# Patient Record
Sex: FEMALE | Race: OTHER | Employment: UNEMPLOYED | URBAN - METROPOLITAN AREA
[De-identification: names, ages, dates, MRNs, and addresses within clinical notes are randomized per-mention and may not be internally consistent; named-entity substitution may affect disease eponyms.]

---

## 2018-05-07 ENCOUNTER — HOSPITAL ENCOUNTER (OUTPATIENT)
Dept: ULTRASOUND IMAGING | Age: 33
Discharge: HOME | End: 2018-05-07
Attending: NURSE PRACTITIONER
Payer: MEDICAID

## 2018-05-07 DIAGNOSIS — N93.9 UTERINE HEMORRHAGE: ICD-10-CM

## 2018-05-07 PROCEDURE — 76830 TRANSVAGINAL US NON-OB: CPT

## 2018-11-05 LAB
CHLAMYDIA, EXTERNAL: NORMAL
HBSAG, EXTERNAL: NORMAL
HIV, EXTERNAL: NORMAL
N. GONORRHEA, EXTERNAL: NORMAL
RPR, EXTERNAL: NORMAL
RUBELLA, EXTERNAL: NORMAL
TYPE, ABO & RH, EXTERNAL: NORMAL

## 2018-12-12 ENCOUNTER — HOSPITAL ENCOUNTER (EMERGENCY)
Age: 33
Discharge: HOME | End: 2018-12-12
Attending: EMERGENCY MEDICINE
Payer: MEDICAID

## 2018-12-12 VITALS
OXYGEN SATURATION: 100 % | HEART RATE: 84 BPM | HEIGHT: 63 IN | SYSTOLIC BLOOD PRESSURE: 124 MMHG | TEMPERATURE: 98.3 F | RESPIRATION RATE: 16 BRPM | BODY MASS INDEX: 45.16 KG/M2 | WEIGHT: 254.85 LBS | DIASTOLIC BLOOD PRESSURE: 78 MMHG

## 2018-12-12 DIAGNOSIS — R82.71 ASYMPTOMATIC BACTERIURIA DURING PREGNANCY IN FIRST TRIMESTER: ICD-10-CM

## 2018-12-12 DIAGNOSIS — O99.891 ASYMPTOMATIC BACTERIURIA DURING PREGNANCY IN FIRST TRIMESTER: ICD-10-CM

## 2018-12-12 DIAGNOSIS — O20.0 THREATENED MISCARRIAGE IN EARLY PREGNANCY: Primary | ICD-10-CM

## 2018-12-12 LAB
ALBUMIN SERPL-MCNC: 2.9 G/DL (ref 3.4–5)
ALBUMIN/GLOB SERPL: 0.7 {RATIO} (ref 0.8–1.7)
ALP SERPL-CCNC: 82 U/L (ref 45–117)
ALT SERPL-CCNC: 14 U/L (ref 13–56)
ANION GAP SERPL CALC-SCNC: 6 MMOL/L (ref 3–18)
APPEARANCE UR: ABNORMAL
AST SERPL-CCNC: 15 U/L (ref 15–37)
BACTERIA URNS QL MICRO: ABNORMAL /HPF
BASOPHILS # BLD: 0 K/UL (ref 0–0.1)
BASOPHILS NFR BLD: 0 % (ref 0–2)
BILIRUB SERPL-MCNC: 0.2 MG/DL (ref 0.2–1)
BILIRUB UR QL: ABNORMAL
BUN SERPL-MCNC: 5 MG/DL (ref 7–18)
BUN/CREAT SERPL: 10
CALCIUM SERPL-MCNC: 8.6 MG/DL (ref 8.5–10.1)
CHLORIDE SERPL-SCNC: 102 MMOL/L (ref 100–108)
CO2 SERPL-SCNC: 26 MMOL/L (ref 21–32)
COLOR UR: ABNORMAL
CREAT SERPL-MCNC: 0.49 MG/DL (ref 0.6–1.3)
DIFFERENTIAL METHOD BLD: ABNORMAL
EOSINOPHIL # BLD: 0.1 K/UL (ref 0–0.4)
EOSINOPHIL NFR BLD: 1 % (ref 0–5)
EPITH CASTS URNS QL MICRO: ABNORMAL /LPF (ref 0–5)
ERYTHROCYTE [DISTWIDTH] IN BLOOD BY AUTOMATED COUNT: 14.4 % (ref 11.6–14.5)
GLOBULIN SER CALC-MCNC: 3.9 G/DL (ref 2–4)
GLUCOSE SERPL-MCNC: 70 MG/DL (ref 74–99)
GLUCOSE UR STRIP.AUTO-MCNC: NEGATIVE MG/DL
HCT VFR BLD AUTO: 33.6 % (ref 35–45)
HGB BLD-MCNC: 10.8 G/DL (ref 12–16)
HGB UR QL STRIP: ABNORMAL
KETONES UR QL STRIP.AUTO: ABNORMAL MG/DL
LEUKOCYTE ESTERASE UR QL STRIP.AUTO: NEGATIVE
LYMPHOCYTES # BLD: 1.2 K/UL (ref 0.9–3.6)
LYMPHOCYTES NFR BLD: 14 % (ref 21–52)
MCH RBC QN AUTO: 23.6 PG (ref 24–34)
MCHC RBC AUTO-ENTMCNC: 32.1 G/DL (ref 31–37)
MCV RBC AUTO: 73.4 FL (ref 74–97)
MONOCYTES # BLD: 0.3 K/UL (ref 0.05–1.2)
MONOCYTES NFR BLD: 4 % (ref 3–10)
NEUTS SEG # BLD: 6.9 K/UL (ref 1.8–8)
NEUTS SEG NFR BLD: 81 % (ref 40–73)
NITRITE UR QL STRIP.AUTO: POSITIVE
PH UR STRIP: 8.5 [PH] (ref 5–8)
PLATELET # BLD AUTO: 308 K/UL (ref 135–420)
PMV BLD AUTO: 8.7 FL (ref 9.2–11.8)
POTASSIUM SERPL-SCNC: 3.9 MMOL/L (ref 3.5–5.5)
PROT SERPL-MCNC: 6.8 G/DL (ref 6.4–8.2)
PROT UR STRIP-MCNC: 100 MG/DL
RBC # BLD AUTO: 4.58 M/UL (ref 4.2–5.3)
RBC #/AREA URNS HPF: ABNORMAL /HPF (ref 0–5)
RBC MORPH BLD: ABNORMAL
RBC MORPH BLD: ABNORMAL
SERVICE CMNT-IMP: NORMAL
SODIUM SERPL-SCNC: 134 MMOL/L (ref 136–145)
SP GR UR REFRACTOMETRY: 1.02 (ref 1–1.03)
UROBILINOGEN UR QL STRIP.AUTO: 1 EU/DL (ref 0.2–1)
WBC # BLD AUTO: 8.5 K/UL (ref 4.6–13.2)
WBC URNS QL MICRO: ABNORMAL /HPF (ref 0–5)
WET PREP GENITAL: NORMAL

## 2018-12-12 PROCEDURE — 87086 URINE CULTURE/COLONY COUNT: CPT

## 2018-12-12 PROCEDURE — 74011250637 HC RX REV CODE- 250/637: Performed by: PHYSICIAN ASSISTANT

## 2018-12-12 PROCEDURE — 99284 EMERGENCY DEPT VISIT MOD MDM: CPT

## 2018-12-12 PROCEDURE — 85025 COMPLETE CBC W/AUTO DIFF WBC: CPT

## 2018-12-12 PROCEDURE — 87491 CHLMYD TRACH DNA AMP PROBE: CPT

## 2018-12-12 PROCEDURE — 87210 SMEAR WET MOUNT SALINE/INK: CPT

## 2018-12-12 PROCEDURE — 80053 COMPREHEN METABOLIC PANEL: CPT

## 2018-12-12 PROCEDURE — 81001 URINALYSIS AUTO W/SCOPE: CPT

## 2018-12-12 RX ORDER — CHOLECALCIFEROL (VITAMIN D3) 125 MCG
CAPSULE ORAL
COMMUNITY

## 2018-12-12 RX ORDER — CEPHALEXIN 500 MG/1
500 CAPSULE ORAL 2 TIMES DAILY
Qty: 14 CAP | Refills: 0 | Status: SHIPPED | OUTPATIENT
Start: 2018-12-12 | End: 2018-12-19

## 2018-12-12 RX ORDER — LEVOTHYROXINE SODIUM 175 UG/1
175 TABLET ORAL
COMMUNITY

## 2018-12-12 RX ORDER — ACETAMINOPHEN 500 MG
500 TABLET ORAL
Status: COMPLETED | OUTPATIENT
Start: 2018-12-12 | End: 2018-12-12

## 2018-12-12 RX ADMIN — ACETAMINOPHEN 500 MG: 500 TABLET ORAL at 13:47

## 2018-12-12 NOTE — ED PROVIDER NOTES
EMERGENCY DEPARTMENT HISTORY AND PHYSICAL EXAM    Date: 2018  Patient Name: Leif Alexandra    History of Presenting Illness     Chief Complaint   Patient presents with    Vaginal Bleeding         History Provided By: Patient    Chief Complaint: Vaginal bleeding  Duration: 3 Hours  Timing:  Acute  Location: Vagina  Quality: Light  Severity: Mild  Modifying Factors: None. Associated Symptoms: abdominal cramping    Additional History (Context):   12:33 PM  Leif Alexandra is a 34 y.o. female who is a A1, currently 13 weeks pregnant with LMP 18 and IDA 19 who presents to the emergency department C/O vaginal bleeding noted about 930 this morning. Lighter than a period, has not had to use pad for bleeding. Also with 3/10 lower abd pain. Vomiting yesterday which she has been having intermittently during the pregnancy, normal BM last night. No fever or dysuria. Reports she is followed by [facility], [facility]. OB, IUP confirmed. Pt has an ultrasound picture with her name on it dated 18. Next appointment with OB is tomorrow for early glucola screening and appointment. Reports blood type is A positive. Miscarriage in early pregnancy in 2016 requiring D&C for incomplete . PCP: Jael Mc MD    Current Outpatient Medications   Medication Sig Dispense Refill    prenatal vit calc,iron,folic (PRENATAL VITAMIN PO) Take  by mouth.  cholecalciferol, vitamin D3, (VITAMIN D3) 2,000 unit tab Take  by mouth.  levothyroxine (SYNTHROID) 175 mcg tablet Take 175 mcg by mouth Daily (before breakfast).  cephALEXin (KEFLEX) 500 mg capsule Take 1 Cap by mouth two (2) times a day for 7 days.  14 Cap 0       Past History     Past Medical History:  Past Medical History:   Diagnosis Date    Miscarriage 2015    Morbid obesity ([facility] [address] 2015    BMI 50.9 (May be off, pt is not sure how tall she is.)    Thyroid disease     hypo- no meds- No insurance till a few days ago (2015)       Past Surgical History:  Past Surgical History:   Procedure Laterality Date    HX DILATION AND CURETTAGE         Family History:  No family history on file. Social History:  Social History     Tobacco Use    Smoking status: Former Smoker     Last attempt to quit: 2007     Years since quittin.1    Smokeless tobacco: Never Used   Substance Use Topics    Alcohol use: No    Drug use: No       Allergies:  No Known Allergies      Review of Systems   Review of Systems   Constitutional: Negative for fever. Gastrointestinal: Positive for abdominal pain and vomiting ( resolved). Negative for constipation, diarrhea and nausea. Genitourinary: Positive for vaginal bleeding. Negative for dysuria. All other systems reviewed and are negative. Physical Exam     Vitals:    18 1137   BP: 124/78   Pulse: 84   Resp: 16   Temp: 98.3 °F (36.8 °C)   SpO2: 100%   Weight: 115.6 kg (254 lb 13.6 oz)   Height: 5' 3\" (1.6 m)     Physical Exam   Constitutional: She appears well-developed and well-nourished. No distress. HENT:   Head: Normocephalic and atraumatic. Right Ear: External ear normal.   Left Ear: External ear normal.   Nose: Nose normal.   Eyes: Conjunctivae are normal.   Neck: Normal range of motion. Cardiovascular: Normal rate, regular rhythm and normal heart sounds. Pulmonary/Chest: Effort normal and breath sounds normal. No respiratory distress. She has no wheezes. Abdominal: Soft. Bowel sounds are normal. She exhibits no distension. There is no tenderness. There is no rebound and no guarding. Neurological: She is alert. Skin: Skin is warm and dry. She is not diaphoretic. Psychiatric: She has a normal mood and affect. Vitals reviewed.         Diagnostic Study Results     Labs -     Recent Results (from the past 12 hour(s))   URINALYSIS W/ RFLX MICROSCOPIC    Collection Time: 18 12:58 PM   Result Value Ref Range    Color RED      Appearance BLOODY      Specific gravity 1.020 1.003 - 1.030 pH (UA) 8.5 (H) 5.0 - 8.0      Protein 100 (A) NEG mg/dL    Glucose NEGATIVE  NEG mg/dL    Ketone TRACE (A) NEG mg/dL    Bilirubin SMALL (A) NEG      Blood LARGE (A) NEG      Urobilinogen 1.0 0.2 - 1.0 EU/dL    Nitrites POSITIVE (A) NEG      Leukocyte Esterase NEGATIVE  NEG     URINE MICROSCOPIC ONLY    Collection Time: 12/12/18 12:58 PM   Result Value Ref Range    WBC 2 to 3 0 - 5 /hpf    RBC TOO NUMEROUS TO COUNT 0 - 5 /hpf    Epithelial cells 3+ 0 - 5 /lpf    Bacteria 1+ (A) NEG /hpf   CBC WITH AUTOMATED DIFF    Collection Time: 12/12/18  1:40 PM   Result Value Ref Range    WBC 8.5 4.6 - 13.2 K/uL    RBC 4.58 4.20 - 5.30 M/uL    HGB 10.8 (L) 12.0 - 16.0 g/dL    HCT 33.6 (L) 35.0 - 45.0 %    MCV 73.4 (L) 74.0 - 97.0 FL    MCH 23.6 (L) 24.0 - 34.0 PG    MCHC 32.1 31.0 - 37.0 g/dL    RDW 14.4 11.6 - 14.5 %    PLATELET 864 502 - 237 K/uL    MPV 8.7 (L) 9.2 - 11.8 FL    NEUTROPHILS 81 (H) 40 - 73 %    LYMPHOCYTES 14 (L) 21 - 52 %    MONOCYTES 4 3 - 10 %    EOSINOPHILS 1 0 - 5 %    BASOPHILS 0 0 - 2 %    ABS. NEUTROPHILS 6.9 1.8 - 8.0 K/UL    ABS. LYMPHOCYTES 1.2 0.9 - 3.6 K/UL    ABS. MONOCYTES 0.3 0.05 - 1.2 K/UL    ABS. EOSINOPHILS 0.1 0.0 - 0.4 K/UL    ABS. BASOPHILS 0.0 0.0 - 0.1 K/UL    RBC COMMENTS MICROCYTOSIS  1+        RBC COMMENTS SPHEROCYTES  1+        DF AUTOMATED     METABOLIC PANEL, COMPREHENSIVE    Collection Time: 12/12/18  1:40 PM   Result Value Ref Range    Sodium 134 (L) 136 - 145 mmol/L    Potassium 3.9 3.5 - 5.5 mmol/L    Chloride 102 100 - 108 mmol/L    CO2 26 21 - 32 mmol/L    Anion gap 6 3.0 - 18 mmol/L    Glucose 70 (L) 74 - 99 mg/dL    BUN 5 (L) 7.0 - 18 MG/DL    Creatinine 0.49 (L) 0.6 - 1.3 MG/DL    BUN/Creatinine ratio 10      GFR est AA >60 >60 ml/min/1.73m2    GFR est non-AA >60 >60 ml/min/1.73m2    Calcium 8.6 8.5 - 10.1 MG/DL    Bilirubin, total 0.2 0.2 - 1.0 MG/DL    ALT (SGPT) 14 13 - 56 U/L    AST (SGOT) 15 15 - 37 U/L    Alk.  phosphatase 82 45 - 117 U/L    Protein, total 6.8 6.4 - 8.2 g/dL    Albumin 2.9 (L) 3.4 - 5.0 g/dL    Globulin 3.9 2.0 - 4.0 g/dL    A-G Ratio 0.7 (L) 0.8 - 1.7     WET PREP    Collection Time: 12/12/18  1:50 PM   Result Value Ref Range    Special Requests: NO SPECIAL REQUESTS      Wet prep NO YEAST,TRICHOMONAS OR CLUE CELLS NOTED         Radiologic Studies -   No orders to display     CT Results  (Last 48 hours)    None        CXR Results  (Last 48 hours)    None          Medications given in the ED-  Medications   acetaminophen (TYLENOL) tablet 500 mg (500 mg Oral Given 12/12/18 1347)         Medical Decision Making   I am the first provider for this patient. I reviewed the vital signs, available nursing notes, past medical history, past surgical history, family history and social history. Vital Signs-Reviewed the patient's vital signs. Pulse Oximetry Analysis - 100% on RA     Records Reviewed: Nursing Notes    Provider Notes (Medical Decision Making): Appears well and non-toxic, presenting with vaginal bleeding in pregnancy. Bleeding is mild, cervical os closed here, H/H stable, good FHT. IUP confirmed previously (showed me pictures with her name on US pictures), A positive blood type. Most c/w threatened miscarriage in early preg. Based on today's assessment, I feel the patient is stable for discharge to home with outpatient follow up. Return precautions and referrals provided. Procedures:  Pelvic Exam  Date/Time: 12/12/2018 1:52 PM  Performed by: PA  Exam assisted by:  LMP Sherron Gottron. Type of exam performed: bimanual and speculum. External genitalia appearance: normal.    Vagina findings: scant blood in vaginal vault. Bleeding: mild  Cervical exam:  os closed and no cervical motion tenderness. Specimen(s) collected:  GC, chlamydia and vaginal culture. Bimanual exam:  normal.    Patient tolerance: Patient tolerated the procedure well with no immediate complications          ED Course:   12:33 PM Initial assessment performed.  The patients presenting problems have been discussed, and they are in agreement with the care plan formulated and outlined with them. I have encouraged them to ask questions as they arise throughout their visit.    3:02 PM Pt updated on all labs and plan. Diagnosis and Disposition       DISCHARGE NOTE:  3:02 PM  Salena Rabago  results have been reviewed with her. She has been counseled regarding her diagnosis. She verbally conveys understanding and agreement of the signs, symptoms, diagnosis, treatment and prognosis and additionally agrees to follow up as recommended with Dr. Ashley Haywood MD in 24 - 48 hours. She also agrees with the care-plan and conveys that all of her questions have been answered. I have also put together some discharge instructions for her that include: 1) educational information regarding their diagnosis, 2) how to care for their diagnosis at home, as well a 3) list of reasons why they would want to return to the ED prior to their follow-up appointment, should their condition change. CLINICAL IMPRESSION:    1. Threatened miscarriage in early pregnancy    2. Asymptomatic bacteriuria during pregnancy in first trimester        PLAN:  1. D/C Home  2. Current Discharge Medication List      START taking these medications    Details   cephALEXin (KEFLEX) 500 mg capsule Take 1 Cap by mouth two (2) times a day for 7 days. Qty: 14 Cap, Refills: 0           3.    Follow-up Information     Follow up With Specialties Details Why [address] [facility]  On 12/13/2018 as scheduled for glucose screening and further evaluation of vaginal bleeding [address]    THE [facility] EMERGENCY DEPT Emergency Medicine  As needed, If symptoms worsen [address]  [id number] [phone]

## 2018-12-12 NOTE — DISCHARGE INSTRUCTIONS
Antibiotic as prescribed. Tylenol if needed for abdominal pain. Return for lightheadedness, dizziness, worsening pain, severe or heavy bleeding, or any other concerns. Follow up with OB tomorrow as scheduled. Threatened Miscarriage: Care Instructions  Your Care Instructions    Some women have light spotting or bleeding during the first 12 weeks of pregnancy. In some cases this is normal. Light spotting or bleeding can also be a sign of a possible loss of the pregnancy. This is called a threatened miscarriage. At this point, the doctor may not be able to tell if your vaginal bleeding is normal or is a sign of a miscarriage. In early pregnancy, things such as stress, exercise, and sex do not cause miscarriage. You may be worried or upset about the possibility of losing your pregnancy. But do not blame yourself. There is no treatment to stop a threatened miscarriage. If you do have a miscarriage, there was nothing you could have done to prevent it. A miscarriage usually means that the pregnancy is not developing normally. The doctor has checked you carefully, but problems can develop later. If you notice any problems or new symptoms, get medical treatment right away. Follow-up care is a key part of your treatment and safety. Be sure to make and go to all appointments, and call your doctor if you are having problems. It's also a good idea to know your test results and keep a list of the medicines you take. How can you care for yourself at home? · If you do have a miscarriage, you will probably have some vaginal bleeding for 1 to 2 weeks. Use pads instead of tampons. · Take acetaminophen (Tylenol) for cramps. Read and follow all instructions on the label. · Do not take two or more pain medicines at the same time unless the doctor told you to. Many pain medicines have acetaminophen, which is Tylenol. Too much acetaminophen (Tylenol) can be harmful. · Do not have sex until your doctor says it is okay.   · Get lots of rest over the next several days. · You may do your normal activities if you feel well enough to do them. But do not do any heavy exercise until your doctor says it is okay. · Eat a balanced diet that is high in iron and vitamin C. Foods rich in iron include red meat, shellfish, eggs, beans, and leafy green vegetables. Foods high in vitamin C include citrus fruits, tomatoes, and broccoli. Talk to your doctor about whether you need to take iron pills or a multivitamin. · Do not drink alcohol or use tobacco or illegal drugs. · Do not smoke. If you need help quitting, talk to your doctor about stop-smoking programs and medicines. These can increase your chances of quitting for good. When should you call for help? Call 911 anytime you think you may need emergency care. For example, call if:    · You passed out (lost consciousness).    Call your doctor now or seek immediate medical care if:    · You have severe vaginal bleeding.     · You are dizzy or lightheaded, or you feel like you may faint.     · You have new or worse pain in your belly or pelvis.     · You have a fever.     · You have vaginal discharge that smells bad.    Watch closely for changes in your health, and be sure to contact your doctor if:    · You do not get better as expected. Where can you learn more? Go to http://jessica-kimber.info/. Enter H118 in the search box to learn more about \"Threatened Miscarriage: Care Instructions. \"  Current as of: November 21, 2017  Content Version: 11.8  © 5597-5157 [employer]wise, Incorporated. Care instructions adapted under license by [employer] (which disclaims liability or warranty for this information). If you have questions about a medical condition or this instruction, always ask your healthcare professional. [address] any warranty or liability for your use of this information.

## 2018-12-13 LAB
C TRACH RRNA SPEC QL NAA+PROBE: NEGATIVE
N GONORRHOEA RRNA SPEC QL NAA+PROBE: NEGATIVE
SPECIMEN SOURCE: NORMAL

## 2018-12-14 LAB
BACTERIA SPEC CULT: NORMAL
SERVICE CMNT-IMP: NORMAL

## 2019-04-30 ENCOUNTER — HOSPITAL ENCOUNTER (INPATIENT)
Age: 34
LOS: 6 days | Discharge: HOME | DRG: 540 | End: 2019-05-06
Attending: OBSTETRICS & GYNECOLOGY | Admitting: OBSTETRICS & GYNECOLOGY
Payer: MEDICAID

## 2019-04-30 PROBLEM — E66.01 MORBID OBESITY (HCC): Status: ACTIVE | Noted: 2019-04-30

## 2019-04-30 PROBLEM — O16.3 ELEVATED BLOOD PRESSURE AFFECTING PREGNANCY IN THIRD TRIMESTER, ANTEPARTUM: Status: ACTIVE | Noted: 2019-04-30

## 2019-04-30 PROBLEM — J45.909 ASTHMA: Status: ACTIVE | Noted: 2019-04-30

## 2019-04-30 PROBLEM — E66.9 OBESITY: Status: ACTIVE | Noted: 2019-04-30

## 2019-04-30 PROBLEM — O09.90 AT HIGH RISK FOR COMPLICATIONS OF INTRAUTERINE PREGNANCY (IUP): Status: ACTIVE | Noted: 2019-04-30

## 2019-04-30 PROBLEM — E03.9 HYPOTHYROIDISM: Status: ACTIVE | Noted: 2019-04-30

## 2019-04-30 LAB
ABO + RH BLD: NORMAL
ALBUMIN SERPL-MCNC: 2.2 G/DL (ref 3.4–5)
ALBUMIN/GLOB SERPL: 0.6 {RATIO} (ref 0.8–1.7)
ALP SERPL-CCNC: 170 U/L (ref 45–117)
ALT SERPL-CCNC: 15 U/L (ref 13–56)
ANION GAP SERPL CALC-SCNC: 7 MMOL/L (ref 3–18)
APPEARANCE UR: ABNORMAL
AST SERPL-CCNC: 49 U/L (ref 15–37)
BASOPHILS # BLD: 0 K/UL (ref 0–0.1)
BASOPHILS NFR BLD: 0 % (ref 0–2)
BILIRUB DIRECT SERPL-MCNC: <0.1 MG/DL (ref 0–0.2)
BILIRUB SERPL-MCNC: 0.3 MG/DL (ref 0.2–1)
BILIRUB UR QL: NEGATIVE
BLOOD GROUP ANTIBODIES SERPL: NORMAL
BUN SERPL-MCNC: 9 MG/DL (ref 7–18)
BUN/CREAT SERPL: 13 (ref 12–20)
CALCIUM SERPL-MCNC: 9.1 MG/DL (ref 8.5–10.1)
CHLORIDE SERPL-SCNC: 106 MMOL/L (ref 100–108)
CO2 SERPL-SCNC: 24 MMOL/L (ref 21–32)
COLOR UR: YELLOW
CREAT SERPL-MCNC: 0.67 MG/DL (ref 0.6–1.3)
CREAT UR-MCNC: 116 MG/DL (ref 30–125)
DIFFERENTIAL METHOD BLD: ABNORMAL
EOSINOPHIL # BLD: 0 K/UL (ref 0–0.4)
EOSINOPHIL NFR BLD: 1 % (ref 0–5)
ERYTHROCYTE [DISTWIDTH] IN BLOOD BY AUTOMATED COUNT: 15.6 % (ref 11.6–14.5)
GLOBULIN SER CALC-MCNC: 3.8 G/DL (ref 2–4)
GLUCOSE SERPL-MCNC: 63 MG/DL (ref 74–99)
GLUCOSE UR QL STRIP.AUTO: NEGATIVE MG/DL
HCT VFR BLD AUTO: 31.6 % (ref 35–45)
HGB BLD-MCNC: 10.1 G/DL (ref 12–16)
KETONES UR-MCNC: NEGATIVE MG/DL
LEUKOCYTE ESTERASE UR QL STRIP: ABNORMAL
LYMPHOCYTES # BLD: 1.4 K/UL (ref 0.9–3.6)
LYMPHOCYTES NFR BLD: 18 % (ref 21–52)
MCH RBC QN AUTO: 23.1 PG (ref 24–34)
MCHC RBC AUTO-ENTMCNC: 32 G/DL (ref 31–37)
MCV RBC AUTO: 72.1 FL (ref 74–97)
MONOCYTES # BLD: 0.4 K/UL (ref 0.05–1.2)
MONOCYTES NFR BLD: 6 % (ref 3–10)
NEUTS SEG # BLD: 5.8 K/UL (ref 1.8–8)
NEUTS SEG NFR BLD: 75 % (ref 40–73)
NITRITE UR QL: NEGATIVE
PH UR: 7 [PH] (ref 5–9)
PLATELET # BLD AUTO: 270 K/UL (ref 135–420)
PMV BLD AUTO: 9.3 FL (ref 9.2–11.8)
POTASSIUM SERPL-SCNC: 5.1 MMOL/L (ref 3.5–5.5)
PROT SERPL-MCNC: 6 G/DL (ref 6.4–8.2)
PROT UR QL: >300 MG/DL
PROT UR-MCNC: 404 MG/DL
PROT UR-MCNC: 410 MG/DL
PROT/CREAT UR-RTO: 3.5
RBC # BLD AUTO: 4.38 M/UL (ref 4.2–5.3)
RBC # UR STRIP: ABNORMAL /UL
SERVICE CMNT-IMP: ABNORMAL
SODIUM SERPL-SCNC: 137 MMOL/L (ref 136–145)
SP GR UR: >1.03 (ref 1–1.02)
SPECIMEN EXP DATE BLD: NORMAL
URATE SERPL-MCNC: 4.8 MG/DL (ref 2.6–7.2)
UROBILINOGEN UR QL: 0.2 EU/DL (ref 0.2–1)
WBC # BLD AUTO: 7.7 K/UL (ref 4.6–13.2)

## 2019-04-30 PROCEDURE — 80048 BASIC METABOLIC PNL TOTAL CA: CPT

## 2019-04-30 PROCEDURE — 85025 COMPLETE CBC W/AUTO DIFF WBC: CPT

## 2019-04-30 PROCEDURE — 74011250636 HC RX REV CODE- 250/636: Performed by: MIDWIFE

## 2019-04-30 PROCEDURE — 84156 ASSAY OF PROTEIN URINE: CPT

## 2019-04-30 PROCEDURE — 80076 HEPATIC FUNCTION PANEL: CPT

## 2019-04-30 PROCEDURE — 81003 URINALYSIS AUTO W/O SCOPE: CPT

## 2019-04-30 PROCEDURE — 65270000029 HC RM PRIVATE

## 2019-04-30 PROCEDURE — 77030033269 HC SLV COMPR SCD KNE2 CARD -B

## 2019-04-30 PROCEDURE — 84550 ASSAY OF BLOOD/URIC ACID: CPT

## 2019-04-30 PROCEDURE — 77030034849

## 2019-04-30 PROCEDURE — 99218 HC RM OBSERVATION: CPT

## 2019-04-30 PROCEDURE — 86900 BLOOD TYPING SEROLOGIC ABO: CPT

## 2019-04-30 PROCEDURE — 36415 COLL VENOUS BLD VENIPUNCTURE: CPT

## 2019-04-30 PROCEDURE — 74011000250 HC RX REV CODE- 250: Performed by: ADVANCED PRACTICE MIDWIFE

## 2019-04-30 RX ORDER — HYDRALAZINE HYDROCHLORIDE 20 MG/ML
10 INJECTION INTRAMUSCULAR; INTRAVENOUS
Status: ACTIVE | OUTPATIENT
Start: 2019-05-03 | End: 2019-05-04

## 2019-04-30 RX ORDER — LEVETIRACETAM 5 MG/ML
500 INJECTION INTRAVASCULAR
Status: DISCONTINUED | OUTPATIENT
Start: 2019-04-30 | End: 2019-05-06 | Stop reason: HOSPADM

## 2019-04-30 RX ORDER — SODIUM CHLORIDE, SODIUM LACTATE, POTASSIUM CHLORIDE, CALCIUM CHLORIDE 600; 310; 30; 20 MG/100ML; MG/100ML; MG/100ML; MG/100ML
75 INJECTION, SOLUTION INTRAVENOUS CONTINUOUS
Status: DISCONTINUED | OUTPATIENT
Start: 2019-04-30 | End: 2019-04-30 | Stop reason: SDUPTHER

## 2019-04-30 RX ORDER — LABETALOL HYDROCHLORIDE 5 MG/ML
20 INJECTION, SOLUTION INTRAVENOUS ONCE
Status: COMPLETED | OUTPATIENT
Start: 2019-04-30 | End: 2019-04-30

## 2019-04-30 RX ORDER — DIAZEPAM 10 MG/2ML
5 INJECTION INTRAMUSCULAR
Status: DISCONTINUED | OUTPATIENT
Start: 2019-04-30 | End: 2019-05-06 | Stop reason: HOSPADM

## 2019-04-30 RX ORDER — CALCIUM GLUCONATE 94 MG/ML
1 INJECTION, SOLUTION INTRAVENOUS AS NEEDED
Status: DISCONTINUED | OUTPATIENT
Start: 2019-04-30 | End: 2019-05-06 | Stop reason: HOSPADM

## 2019-04-30 RX ORDER — BETAMETHASONE SODIUM PHOSPHATE AND BETAMETHASONE ACETATE 3; 3 MG/ML; MG/ML
12 INJECTION, SUSPENSION INTRA-ARTICULAR; INTRALESIONAL; INTRAMUSCULAR; SOFT TISSUE EVERY 24 HOURS
Status: COMPLETED | OUTPATIENT
Start: 2019-04-30 | End: 2019-05-01

## 2019-04-30 RX ORDER — ACETAMINOPHEN 500 MG
1000 TABLET ORAL
Status: DISCONTINUED | OUTPATIENT
Start: 2019-04-30 | End: 2019-05-06 | Stop reason: HOSPADM

## 2019-04-30 RX ORDER — MAGNESIUM SULFATE HEPTAHYDRATE 40 MG/ML
2 INJECTION, SOLUTION INTRAVENOUS
Status: COMPLETED | OUTPATIENT
Start: 2019-04-30 | End: 2019-04-30

## 2019-04-30 RX ORDER — LORAZEPAM 2 MG/ML
2 INJECTION INTRAMUSCULAR
Status: DISCONTINUED | OUTPATIENT
Start: 2019-04-30 | End: 2019-05-06 | Stop reason: HOSPADM

## 2019-04-30 RX ORDER — SODIUM CHLORIDE, SODIUM LACTATE, POTASSIUM CHLORIDE, CALCIUM CHLORIDE 600; 310; 30; 20 MG/100ML; MG/100ML; MG/100ML; MG/100ML
75 INJECTION, SOLUTION INTRAVENOUS CONTINUOUS
Status: DISCONTINUED | OUTPATIENT
Start: 2019-04-30 | End: 2019-05-06 | Stop reason: HOSPADM

## 2019-04-30 RX ORDER — LABETALOL HYDROCHLORIDE 5 MG/ML
20 INJECTION, SOLUTION INTRAVENOUS
Status: ACTIVE | OUTPATIENT
Start: 2019-04-30 | End: 2019-05-02

## 2019-04-30 RX ORDER — MAGNESIUM SULFATE HEPTAHYDRATE 40 MG/ML
1 INJECTION, SOLUTION INTRAVENOUS CONTINUOUS
Status: DISCONTINUED | OUTPATIENT
Start: 2019-04-30 | End: 2019-05-06

## 2019-04-30 RX ORDER — MAGNESIUM SULFATE HEPTAHYDRATE 40 MG/ML
4 INJECTION, SOLUTION INTRAVENOUS
Status: COMPLETED | OUTPATIENT
Start: 2019-04-30 | End: 2019-04-30

## 2019-04-30 RX ADMIN — LABETALOL HYDROCHLORIDE 5 MG: 5 INJECTION INTRAVENOUS at 15:33

## 2019-04-30 RX ADMIN — LABETALOL HYDROCHLORIDE 20 MG: 5 INJECTION INTRAVENOUS at 17:19

## 2019-04-30 RX ADMIN — MAGNESIUM SULFATE HEPTAHYDRATE 2 G/HR: 40 INJECTION, SOLUTION INTRAVENOUS at 18:49

## 2019-04-30 RX ADMIN — LABETALOL HYDROCHLORIDE 20 MG: 5 INJECTION INTRAVENOUS at 17:04

## 2019-04-30 RX ADMIN — MAGNESIUM SULFATE HEPTAHYDRATE 4 G: 40 INJECTION, SOLUTION INTRAVENOUS at 18:03

## 2019-04-30 RX ADMIN — BETAMETHASONE SODIUM PHOSPHATE AND BETAMETHASONE ACETATE 12 MG: 3; 3 INJECTION, SUSPENSION INTRA-ARTICULAR; INTRALESIONAL; INTRAMUSCULAR at 17:59

## 2019-04-30 RX ADMIN — MAGNESIUM SULFATE HEPTAHYDRATE 2 G: 40 INJECTION, SOLUTION INTRAVENOUS at 18:34

## 2019-04-30 RX ADMIN — LABETALOL HYDROCHLORIDE 40 MG: 5 INJECTION INTRAVENOUS at 17:31

## 2019-04-30 RX ADMIN — SODIUM CHLORIDE, SODIUM LACTATE, POTASSIUM CHLORIDE, AND CALCIUM CHLORIDE 125 ML/HR: 600; 310; 30; 20 INJECTION, SOLUTION INTRAVENOUS at 14:51

## 2019-04-30 NOTE — PROGRESS NOTES
Ante Partum Progress Note Luiza Chavez 
[id number] Assessment: 33w6d Pre-eclampsia, severe Plan:  Continue hospitalization with hospitalized bedrest, Begin  steroid course. [address] [facility] Laboratory evaluation: Blood work repeat tomorrow, 24 hour urine in progress. Start Mag Sulfate for Severe Range BP. Treat BP as indicated. Discuss POC with Morro Drake DO. Will consult./collab PRN. Orders/Charges: Medium Patient states she has no new complaints. Denies, headache, blurry vision, RUQ pain. Vitals: 
Visit Vitals /76 Pulse 77 Temp 98.1 °F (36.7 °C) Resp 18 Ht 5' 3\" (1.6 m) Wt 127 kg (280 lb) BMI 49.60 kg/m² Temp (24hrs), Av.1 °F (36.7 °C), Min:98.1 °F (36.7 °C), Max:98.1 °F (36.7 °C) Last 24hr Input/Output: 
No intake or output data in the 24 hours ending 19 Non stress test:  Reactive Uterine Activity: None Exam:  Patient without distress. Abdomen, fundus soft non-tender Extremities, no redness or tenderness CERVIX;  deferred Additional Exam: Lower extremities edema 2+, Patellar Reflexes: 2+ bilaterally and Clonus: absent Labs: P/C ratio pending Lab Results Component Value Date/Time WBC 7.7 2019 02:57 PM  
 WBC 8.5 2018 01:40 PM  
 WBC 6.2 2016 09:33 AM  
 HGB 10.1 (L) 2019 02:57 PM  
 HGB 10.8 (L) 2018 01:40 PM  
 HGB 7.7 (L) 2016 04:36 AM  
 HCT 31.6 (L) 2019 02:57 PM  
 HCT 33.6 (L) 2018 01:40 PM  
 HCT 24.7 (L) 2016 04:36 AM  
 PLATELET 436  02:57 PM  
 PLATELET 178  01:40 PM  
 PLATELET 569  09:33 AM  
 
 
Recent Results (from the past 24 hour(s)) PROTEIN URINE, RANDOM Collection Time: 19  2:00 PM  
Result Value Ref Range Protein, urine random 410 (H) <11.9 mg/dL POC URINE MACROSCOPIC Collection Time: 19  2:04 PM  
Result Value Ref Range Color YELLOW Appearance SLIGHTLY CLOUDY Spec. gravity (POC) >1.030 (H) 1.001 - 1.023  
 pH, urine  (POC) 7.0 5.0 - 9.0 Protein (POC) >300 (A) NEG mg/dL Glucose, urine (POC) NEGATIVE  NEG mg/dL Ketones (POC) NEGATIVE  NEG mg/dL Bilirubin (POC) NEGATIVE  NEG Blood (POC) Trace Intact (A) NEG Urobilinogen (POC) 0.2 0.2 - 1.0 EU/dL Nitrite (POC) NEGATIVE  NEG Leukocyte esterase (POC) SMALL (A) NEG Performed by Cherie Eason CBC WITH AUTOMATED DIFF Collection Time: 04/30/19  2:57 PM  
Result Value Ref Range WBC 7.7 4.6 - 13.2 K/uL  
 RBC 4.38 4.20 - 5.30 M/uL  
 HGB 10.1 (L) 12.0 - 16.0 g/dL HCT 31.6 (L) 35.0 - 45.0 % MCV 72.1 (L) 74.0 - 97.0 FL  
 MCH 23.1 (L) 24.0 - 34.0 PG  
 MCHC 32.0 31.0 - 37.0 g/dL  
 RDW 15.6 (H) 11.6 - 14.5 % PLATELET 067 232 - 623 K/uL MPV 9.3 9.2 - 11.8 FL  
 NEUTROPHILS 75 (H) 40 - 73 % LYMPHOCYTES 18 (L) 21 - 52 % MONOCYTES 6 3 - 10 % EOSINOPHILS 1 0 - 5 % BASOPHILS 0 0 - 2 %  
 ABS. NEUTROPHILS 5.8 1.8 - 8.0 K/UL  
 ABS. LYMPHOCYTES 1.4 0.9 - 3.6 K/UL  
 ABS. MONOCYTES 0.4 0.05 - 1.2 K/UL  
 ABS. EOSINOPHILS 0.0 0.0 - 0.4 K/UL  
 ABS. BASOPHILS 0.0 0.0 - 0.1 K/UL  
 DF AUTOMATED METABOLIC PANEL, BASIC Collection Time: 04/30/19  2:57 PM  
Result Value Ref Range Sodium 137 136 - 145 mmol/L Potassium 5.1 3.5 - 5.5 mmol/L Chloride 106 100 - 108 mmol/L  
 CO2 24 21 - 32 mmol/L Anion gap 7 3.0 - 18 mmol/L Glucose 63 (L) 74 - 99 mg/dL BUN 9 7.0 - 18 MG/DL Creatinine 0.67 0.6 - 1.3 MG/DL  
 BUN/Creatinine ratio 13 12 - 20 GFR est AA >60 >60 ml/min/1.73m2 GFR est non-AA >60 >60 ml/min/1.73m2 Calcium 9.1 8.5 - 10.1 MG/DL  
HEPATIC FUNCTION PANEL Collection Time: 04/30/19  2:57 PM  
Result Value Ref Range Protein, total 6.0 (L) 6.4 - 8.2 g/dL Albumin 2.2 (L) 3.4 - 5.0 g/dL Globulin 3.8 2.0 - 4.0 g/dL A-G Ratio 0.6 (L) 0.8 - 1.7 Bilirubin, total 0.3 0.2 - 1.0 MG/DL  Bilirubin, direct <0.1 0.0 - 0.2 MG/DL  
 Alk. phosphatase 170 (H) 45 - 117 U/L  
 AST (SGOT) 49 (H) 15 - 37 U/L  
 ALT (SGPT) 15 13 - 56 U/L  
URIC ACID Collection Time: 04/30/19  2:57 PM  
Result Value Ref Range Uric acid 4.8 2.6 - 7.2 MG/DL  
TYPE & SCREEN Collection Time: 04/30/19  2:57 PM  
Result Value Ref Range Crossmatch Expiration 05/03/2019 ABO/Rh(D) A POSITIVE Antibody screen NEG Signed : Charly Hernández CNM

## 2019-04-30 NOTE — PROGRESS NOTES
[address] arrived on the unit from Dr. Julian Santa office. Pt had high bp in the office. 1345 Pt taken to room 7. Oriented to room and call bell. Started 24 hour urine collection. [address] Marta Gore CNM at bedside. Plan of care reviewed, Pt verbalizes understanding. 1520 Pt up to bathroom. Voided 65 ml. Urine collected. 1525 Pt high-fowlers. US and TOCO adjusted. 1649 Pt up to bathroom. Voided 55ml. Urine collected. 1655 Pt repositioned on left lateral. US and TOCO adjusted. Call bell within reach. 1704 Labetalol given. 1735 Pt repositioned on right lateral. US and TOCO adjusted. 1759 Betamethasone was injected in left ventrogluteal muscle. US and TOCO adjusted. 1803 Magnesium Sulfate started. US and TOCO adjusted. 1815 MARIZA Rodriguez CNM at bedside. Plan of care reviewed, Pt verbalizes understanding. 1840 SCDs put on. 
 
1905 Mon placed by LOC Rodriguez RN and JAIME Rodriguez RN. US and TOCO adjusted. 1920 Bedside and Verbal shift change report given to CLYDE Yost RN with Torres Sullivan RN. 
 
3970 Mon bag emptied, 80 ml urine collected.

## 2019-04-30 NOTE — PROGRESS NOTES
1931:  Assumed care of pt from JAIME Weathers. Pt in room with no c/o pain at this time. Pt currently has Mag 2g/h with LR 75/h currently infusing. Pt has stratton in place with SCD's. Admission orders per MARIZA Rodriguez CNM. Plan of Care discussed with pt 
 
0356:  PT c/o headache 08/10. PRN tylenol administered 0430:  Pt in room sleeping. No c/o headache

## 2019-04-30 NOTE — H&P
History & Physical 
 
Name: Marietta Villaseñor MRN: [id number]  SSN: [id number] YOB: 1985  Age: 34 y.o. Sex: female Subjective:  
Patient sent over from office today for elevated blood pressures. History of Present Illness: Marietta Villaseñor is a 34 y.o.  female with an estimated gestational age of 32w10d with Estimated Date of Delivery: 19. Patient complains of excessive swelling for 2 weeks. Pregnancy has been complicated by elevated blood pressure in physician's office  and asthma and hypothyroidism. Patient denies abdominal pain, contractions, headache, right upper quadrant pain and visual disturbances. OB History  Para Term  AB Living 5 1 1   3 1 SAB TAB Ectopic Molar Multiple Live Births 2 1       1 # Outcome Date GA Lbr Adam/2nd Weight Sex Delivery Anes PTL Lv  
5 Current           
4 TAB 2016     TAB     
3 SAB      SAB     
2 SAB      SAB     
1 Term 2008    M Vag-Spont   KELLI Past Medical History:  
Diagnosis Date  Anemia  Asthma  Miscarriage 2015  Morbid obesity ([facility] [address] 2015 BMI 50.9 (May be off, pt is not sure how tall she is.)  Thyroid disease   
 hypo- no meds- No insurance till a few days ago (2015) Past Surgical History:  
Procedure Laterality Date  HX DILATION AND CURETTAGE    
 HX OTHER SURGICAL Social History Occupational History  Not on file Tobacco Use  Smoking status: Former Smoker Last attempt to quit: 2007 Years since quittin.4  Smokeless tobacco: Never Used Substance and Sexual Activity  Alcohol use: No  
 Drug use: No  
 Sexual activity: Yes History reviewed. No pertinent family history. No Known Allergies Prior to Admission medications Medication Sig Start Date End Date Taking? Authorizing Provider  
prenatal vit calc,iron,folic (PRENATAL VITAMIN PO) Take  by mouth.    Yes Other, MD Olivia  
 cholecalciferol, vitamin D3, (VITAMIN D3) 2,000 unit tab Take  by mouth. Yes Other, MD Olivia  
levothyroxine (SYNTHROID) 175 mcg tablet Take 175 mcg by mouth Daily (before breakfast). Yes Other, MD Olivia  
  
 
Review of Systems Objective:  
 
Vitals:   
Vitals:  
 19 1440 19 1445 19 1500 19 1515 BP: (!) 159/101 (!) 154/126 157/90 (!) 153/95 Pulse: 79 91 71 79 Resp:      
Temp:      
Weight:      
Height:      
  
Temp (24hrs), Av.1 °F (36.7 °C), Min:98.1 °F (36.7 °C), Max:98.1 °F (36.7 °C) BP  Min: 152/98  Max: 161/95 Physical Exam 
 
Cervical Exam: not assessed Adomen: soft, nontender Uterine Activity: None Membranes: Intact Fetal Heart Rate: Reactive Baseline: 125 per minute Variability: moderate Accelerations: yes Decelerations: none Uterine contractions: none DTR 1+ bilaterally, Neg for clonus Edema bilateral  Lower extremities 2+ Labs:  
Recent Results (from the past 24 hour(s)) POC URINE MACROSCOPIC Collection Time: 19  2:04 PM  
Result Value Ref Range Color YELLOW Appearance SLIGHTLY CLOUDY Spec. gravity (POC) >1.030 (H) 1.001 - 1.023  
 pH, urine  (POC) 7.0 5.0 - 9.0 Protein (POC) >300 (A) NEG mg/dL Glucose, urine (POC) NEGATIVE  NEG mg/dL Ketones (POC) NEGATIVE  NEG mg/dL Bilirubin (POC) NEGATIVE  NEG Blood (POC) Trace Intact (A) NEG Urobilinogen (POC) 0.2 0.2 - 1.0 EU/dL Nitrite (POC) NEGATIVE  NEG Leukocyte esterase (POC) SMALL (A) NEG Performed by Jose Raul Fernández CBC WITH AUTOMATED DIFF Collection Time: 19  2:57 PM  
Result Value Ref Range WBC 7.7 4.6 - 13.2 K/uL  
 RBC 4.38 4.20 - 5.30 M/uL  
 HGB 10.1 (L) 12.0 - 16.0 g/dL HCT 31.6 (L) 35.0 - 45.0 % MCV 72.1 (L) 74.0 - 97.0 FL  
 MCH 23.1 (L) 24.0 - 34.0 PG  
 MCHC 32.0 31.0 - 37.0 g/dL  
 RDW 15.6 (H) 11.6 - 14.5 % PLATELET 682 547 - 407 K/uL MPV 9.3 9.2 - 11.8 FL  
 NEUTROPHILS 75 (H) 40 - 73 % LYMPHOCYTES 18 (L) 21 - 52 % MONOCYTES 6 3 - 10 % EOSINOPHILS 1 0 - 5 % BASOPHILS 0 0 - 2 %  
 ABS. NEUTROPHILS 5.8 1.8 - 8.0 K/UL  
 ABS. LYMPHOCYTES 1.4 0.9 - 3.6 K/UL  
 ABS. MONOCYTES 0.4 0.05 - 1.2 K/UL  
 ABS. EOSINOPHILS 0.0 0.0 - 0.4 K/UL  
 ABS. BASOPHILS 0.0 0.0 - 0.1 K/UL  
 DF AUTOMATED Assessment and Plan: Active Problems: 
  Elevated blood pressure affecting pregnancy in third trimester, antepartum (4/30/2019) Morbid obesity ([facility] [address] (4/30/2019) Hypothyroidism (4/30/2019) Asthma (4/30/2019) Plan: 23 hr observation with serial blood pressures. IV labetalol 20 mg x 1. [address] labs drawn. 24 hour urine started. PIH and Pre-Eclampsia precautions.  Strict Bed rest.

## 2019-05-01 LAB
ALBUMIN SERPL-MCNC: 2.2 G/DL (ref 3.4–5)
ALBUMIN/GLOB SERPL: 0.6 {RATIO} (ref 0.8–1.7)
ALP SERPL-CCNC: 164 U/L (ref 45–117)
ALT SERPL-CCNC: 13 U/L (ref 13–56)
ANION GAP SERPL CALC-SCNC: 12 MMOL/L (ref 3–18)
AST SERPL-CCNC: 17 U/L (ref 15–37)
BILIRUB SERPL-MCNC: 0.2 MG/DL (ref 0.2–1)
BUN SERPL-MCNC: 12 MG/DL (ref 7–18)
BUN/CREAT SERPL: 13 (ref 12–20)
CALCIUM SERPL-MCNC: 8 MG/DL (ref 8.5–10.1)
CHLORIDE SERPL-SCNC: 106 MMOL/L (ref 100–108)
CO2 SERPL-SCNC: 18 MMOL/L (ref 21–32)
COLLECT DURATION TIME UR: 24 HR
CREAT SERPL-MCNC: 0.93 MG/DL (ref 0.6–1.3)
ERYTHROCYTE [DISTWIDTH] IN BLOOD BY AUTOMATED COUNT: 16.1 % (ref 11.6–14.5)
GLOBULIN SER CALC-MCNC: 3.6 G/DL (ref 2–4)
GLUCOSE SERPL-MCNC: 100 MG/DL (ref 74–99)
HCT VFR BLD AUTO: 31.2 % (ref 35–45)
HGB BLD-MCNC: 9.9 G/DL (ref 12–16)
LDH SERPL L TO P-CCNC: 158 U/L (ref 81–234)
MAGNESIUM SERPL-MCNC: 4.8 MG/DL (ref 1.6–2.6)
MAGNESIUM SERPL-MCNC: 5.4 MG/DL (ref 1.6–2.6)
MAGNESIUM SERPL-MCNC: 5.9 MG/DL (ref 1.6–2.6)
MAGNESIUM SERPL-MCNC: 6.1 MG/DL (ref 1.6–2.6)
MCH RBC QN AUTO: 23 PG (ref 24–34)
MCHC RBC AUTO-ENTMCNC: 31.7 G/DL (ref 31–37)
MCV RBC AUTO: 72.4 FL (ref 74–97)
PLATELET # BLD AUTO: 321 K/UL (ref 135–420)
PMV BLD AUTO: 9.3 FL (ref 9.2–11.8)
POTASSIUM SERPL-SCNC: 4.9 MMOL/L (ref 3.5–5.5)
PROT 24H UR-MRATE: 3515 MG/24HR
PROT SERPL-MCNC: 5.8 G/DL (ref 6.4–8.2)
RBC # BLD AUTO: 4.31 M/UL (ref 4.2–5.3)
SODIUM SERPL-SCNC: 136 MMOL/L (ref 136–145)
SPECIMEN VOL ?TM UR: 1850 ML
URATE SERPL-MCNC: 5.9 MG/DL (ref 2.6–7.2)
WBC # BLD AUTO: 10.1 K/UL (ref 4.6–13.2)

## 2019-05-01 PROCEDURE — 65270000029 HC RM PRIVATE

## 2019-05-01 PROCEDURE — 74011250636 HC RX REV CODE- 250/636: Performed by: MIDWIFE

## 2019-05-01 PROCEDURE — 83735 ASSAY OF MAGNESIUM: CPT

## 2019-05-01 PROCEDURE — 36415 COLL VENOUS BLD VENIPUNCTURE: CPT

## 2019-05-01 PROCEDURE — 84550 ASSAY OF BLOOD/URIC ACID: CPT

## 2019-05-01 PROCEDURE — 83615 LACTATE (LD) (LDH) ENZYME: CPT

## 2019-05-01 PROCEDURE — 85027 COMPLETE CBC AUTOMATED: CPT

## 2019-05-01 PROCEDURE — 80053 COMPREHEN METABOLIC PANEL: CPT

## 2019-05-01 PROCEDURE — 74011250637 HC RX REV CODE- 250/637: Performed by: MIDWIFE

## 2019-05-01 PROCEDURE — 74011250637 HC RX REV CODE- 250/637: Performed by: ADVANCED PRACTICE MIDWIFE

## 2019-05-01 RX ORDER — LABETALOL 200 MG/1
200 TABLET, FILM COATED ORAL EVERY 12 HOURS
Status: DISCONTINUED | OUTPATIENT
Start: 2019-05-01 | End: 2019-05-06 | Stop reason: HOSPADM

## 2019-05-01 RX ADMIN — MAGNESIUM SULFATE HEPTAHYDRATE 2 G/HR: 40 INJECTION, SOLUTION INTRAVENOUS at 16:45

## 2019-05-01 RX ADMIN — LABETALOL HCL 200 MG: 200 TABLET, FILM COATED ORAL at 14:11

## 2019-05-01 RX ADMIN — LEVOTHYROXINE SODIUM 175 MCG: 25 TABLET ORAL at 07:30

## 2019-05-01 RX ADMIN — ACETAMINOPHEN 1000 MG: 500 TABLET, FILM COATED ORAL at 12:18

## 2019-05-01 RX ADMIN — SODIUM CHLORIDE, SODIUM LACTATE, POTASSIUM CHLORIDE, AND CALCIUM CHLORIDE 75 ML/HR: 600; 310; 30; 20 INJECTION, SOLUTION INTRAVENOUS at 13:30

## 2019-05-01 RX ADMIN — ACETAMINOPHEN 1000 MG: 500 TABLET, FILM COATED ORAL at 03:54

## 2019-05-01 RX ADMIN — BETAMETHASONE SODIUM PHOSPHATE AND BETAMETHASONE ACETATE 12 MG: 3; 3 INJECTION, SUSPENSION INTRA-ARTICULAR; INTRALESIONAL; INTRAMUSCULAR at 17:59

## 2019-05-01 NOTE — PROGRESS NOTES
1044 pt sitting in bed talking with family. Mag check performed. Pt denies needing anything at this time. Reflexes and output wnl at this time

## 2019-05-01 NOTE — CONSULTS
Neonatology Antepartum Consultation    Name: Renny Isaac      Medical Record Number: [id number]      YOB: 1985     Today's Date: May 1, 2019                                                                 Date of Consultation:  May 1, 2019  Time: 2:59 PM  Attending MD: Benny Gillis  Referring Physician: Dr. Janeen Hoffman  Reason for Consultation:  34 week fetus, maternal pre-e    Subjective:     Age: 34 y.o.  Katherine Sanchez  Para:   Gestation age: 31w0d      Maternal steroids: Yes     Objective:     Medications:   Current Facility-Administered Medications   Medication Dose Route Frequency    labetalol (NORMODYNE) tablet 200 mg  200 mg Oral Q12H    labetalol (NORMODYNE;TRANDATE) injection 20 mg  20 mg IntraVENous Q10MIN PRN    Followed by   Phyllis Soriano ON 5/3/2019] hydrALAZINE (APRESOLINE) 20 mg/mL injection 10 mg  10 mg IntraVENous ONCE PRN    magnesium sulfate 40 g/1000 mL Sterile Water infusion  2 g/hr IntraVENous CONTINUOUS    calcium gluconate injection 1 g  1 g IntraVENous PRN    LORazepam (ATIVAN) injection 2 mg  2 mg IntraVENous Q10MIN PRN    diazePAM (VALIUM) injection 5 mg  5 mg IntraVENous Q5MIN PRN    phenytoin (DILANTIN) 1,905 mg in 0.9% sodium chloride 250 mL IVPB  15 mg/kg IntraVENous ONCE PRN    phenytoin (DILANTIN) 1,270 mg in 0.9% sodium chloride 250 mL IVPB  10 mg/kg IntraVENous ONCE PRN    levETIRAcetam (KEPPRA) 500 mg in saline (iso-osm) 100 ml IVPB  500 mg IntraVENous Q12H PRN    betamethasone (CELESTONE) injection 12 mg  12 mg IntraMUSCular Q24H    acetaminophen (TYLENOL) tablet 1,000 mg  1,000 mg Oral Q6H PRN    lactated Ringers infusion  75 mL/hr IntraVENous CONTINUOUS    levothyroxine (SYNTHROID) tablet 175 mcg  175 mcg Oral 6am     Rupture of Membrane:   none  Meconium Stained:   none    Data Review:  Lab:   Lab Results   Component Value Date/Time    ABO/Rh(D) A POSITIVE 2019 02:57 PM    Antibody screen NEG 2019 02:57 PM    Rubella, External Immune 11/05/2018    HBsAg, External neg 11/05/2018    HIV, External NR 11/05/2018    RPR, External NR 11/05/2018    Gonorrhea, External neg 11/05/2018    Chlamydia, External neg 11/05/2018    ABO,Rh A+ 11/05/2018       Assessment: Ms. Candice Conti is at 29 weeks with SIUP of male fetus, presents with pre-e, severe range pressures. Currently on mag and steroids. Pregnancy complicated by asthma and hypothyroidism (not Graves). Discussed anticipated course of a 34 week infant to include need for resuscitation in the delivery room, breathing assistance including NC up to intubation/surf. Feeding assistance including IV access and fluids and/or feeding tube. Need for temp support. Possible stay up to his due date. Parents have no questions and no concerns.         Active Problems:    Elevated blood pressure affecting pregnancy in third trimester, antepartum (4/30/2019)      Morbid obesity ([address] (4/30/2019)      Hypothyroidism (4/30/2019)      Asthma (4/30/2019)      At high risk for complications of intrauterine pregnancy (IUP) (4/30/2019)        OB Concerns/Plan:     Approximate survival statistics in overall population at this Gestation Age 34w0d  Is similar to term children  [x]   Explained limitation of statistics to parents  [x]    Explained NICU coverage and team approach  [x]    Answered question  [x]    Offered tour  []    Obtained consents  [x]    Discussed Benefits of Breast Feeding  []    Discussed the Parent Progress note      Signed: Fabian Castle  Date: 5/1/19  Time: 6038

## 2019-05-01 NOTE — PROGRESS NOTES
0720 Bedside and Verbal shift change report given to Adán Mendoza, CHELO (oncoming nurse) by CLYDE Gibbons RN (offgoing nurse). Report included the following information SBAR, Kardex, Intake/Output and MAR.  
 
0730 Patient resting, denies pain, call bell within reach. 1390 Patient resting, inquiring about breakfast, RN will speak with provider regarding POC and diet order. Patient denies pain or further needs at this time. 0845 YVON Bella on unit. CNM informed of patient request to eat breakfast.  
 
 
1148 patient resting, denies needs. 1300 YVON Norman, STEFFANY aware of headache. PIH panel ordered. 1343 Stratton catheter emptied, contents added to 24 hour urine, 24 hour urine collection completed. Taken to lab. 
 
1355 Labs drawn from left hand. 26 Dr. Yvonne Machuca at bedside for neonatology consult. 1653 Patient assisted to chair at bedside. Bed linens, pads changed. Patient assisted back to bed, US adjusted, stratton and pericare performed. Gown changed. 1715 Patient resting, call bell within reach, lights dimmed, denies needs. 1800 Patient resting, denies needs 1920 Bedside and Verbal shift change report given to CLYDE Gibbons, CHELO (oncoming nurse) by Adán eMndoza RN (offgoing nurse). Report included the following information SBAR, Kardex, Intake/Output and MAR.

## 2019-05-01 NOTE — ROUTINE PROCESS
Bedside and Verbal shift change report given to Nany Bassett RN (oncoming nurse) by Gloria Easley (offgoing nurse). Report included the following information SBAR, Kardex, Intake/Output, MAR, Recent Results.

## 2019-05-01 NOTE — PROGRESS NOTES
High Risk Obstetrics Progress Note Name: Renee Jimenez MRN: [id number]  SSN: [id number] YOB: 1985  Age: 34 y.o. Sex: female Subjective: LOS: 1 day Estimated Date of Delivery: 19 Gestational Age Today: 31w0d Patient admitted for elevated blood pressure in physician's office , preeclampsia with blood pressures in severe range. States she does have moderate headache , normal fetal movement and excessive swelling. Denies epigastric pain and visual changes. Patient is currently on Magnesium Sulfate IV for severe blood pressues Objective:  
 
Vitals:  Blood pressure 140/88, pulse 89, temperature 98.6 °F (37 °C), resp. rate 20, height 5' 3\" (1.6 m), weight 127 kg (280 lb), SpO2 100 %, not currently breastfeeding. Temp (24hrs), Av.2 °F (36.8 °C), Min:97.5 °F (36.4 °C), Max:98.6 °F (37 °C) Systolic (26HEK), IC , Min:117 , Max:164 Diastolic (79PFO), HEV:19, Min:63, Max:126 Intake and Output:    
Date 19 0700 - 19 8560 Shift [id number] 5662-8483 4762-8678 24 Hour Total  
INTAKE Shift Total(mL/kg) OUTPUT Urine(mL/kg/hr) 600   600 Shift Total(mL/kg) 600(4.7)   600(4.7) Weight (kg) 127 127 127 127 Physical Exam: 
Patient without distress. Heart: Regular rate and rhythm, S1S2 present or without murmur or extra heart sounds Lung: clear to auscultation throughout lung fields, no wheezes, no rales, no rhonchi and normal respiratory effort Back: costovertebral angle tenderness absent Abdomen: soft, nontender, normal bowel sounds Lower Extremities:  - Edema 3+ pitting BLE and 1+ edema to hands - Patellar Reflexes: 1+ bilaterally - Clonus: absent Membranes:  Intact Uterine Activity:  None Fetal Heart Rate:  Reactive Baseline: 120 per minute Variability: moderate Accelerations: yes Decelerations: none Labs:  
Recent Results (from the past 36 hour(s)) PROTEIN URINE, RANDOM Collection Time: 04/30/19  2:00 PM  
Result Value Ref Range Protein, urine random 410 (H) <11.9 mg/dL PROTEIN/CREATININE RATIO, URINE Collection Time: 04/30/19  2:00 PM  
Result Value Ref Range Protein, urine random 404 (H) <11.9 mg/dL Creatinine, urine 116.00 30 - 125 mg/dL Protein/Creat. urine Ratio 3.5 POC URINE MACROSCOPIC Collection Time: 04/30/19  2:04 PM  
Result Value Ref Range Color YELLOW Appearance SLIGHTLY CLOUDY Spec. gravity (POC) >1.030 (H) 1.001 - 1.023  
 pH, urine  (POC) 7.0 5.0 - 9.0 Protein (POC) >300 (A) NEG mg/dL Glucose, urine (POC) NEGATIVE  NEG mg/dL Ketones (POC) NEGATIVE  NEG mg/dL Bilirubin (POC) NEGATIVE  NEG Blood (POC) Trace Intact (A) NEG Urobilinogen (POC) 0.2 0.2 - 1.0 EU/dL Nitrite (POC) NEGATIVE  NEG Leukocyte esterase (POC) SMALL (A) NEG Performed by Cherie Eason CBC WITH AUTOMATED DIFF Collection Time: 04/30/19  2:57 PM  
Result Value Ref Range WBC 7.7 4.6 - 13.2 K/uL  
 RBC 4.38 4.20 - 5.30 M/uL  
 HGB 10.1 (L) 12.0 - 16.0 g/dL HCT 31.6 (L) 35.0 - 45.0 % MCV 72.1 (L) 74.0 - 97.0 FL  
 MCH 23.1 (L) 24.0 - 34.0 PG  
 MCHC 32.0 31.0 - 37.0 g/dL  
 RDW 15.6 (H) 11.6 - 14.5 % PLATELET 276 365 - 832 K/uL MPV 9.3 9.2 - 11.8 FL  
 NEUTROPHILS 75 (H) 40 - 73 % LYMPHOCYTES 18 (L) 21 - 52 % MONOCYTES 6 3 - 10 % EOSINOPHILS 1 0 - 5 % BASOPHILS 0 0 - 2 %  
 ABS. NEUTROPHILS 5.8 1.8 - 8.0 K/UL  
 ABS. LYMPHOCYTES 1.4 0.9 - 3.6 K/UL  
 ABS. MONOCYTES 0.4 0.05 - 1.2 K/UL  
 ABS. EOSINOPHILS 0.0 0.0 - 0.4 K/UL  
 ABS. BASOPHILS 0.0 0.0 - 0.1 K/UL  
 DF AUTOMATED METABOLIC PANEL, BASIC Collection Time: 04/30/19  2:57 PM  
Result Value Ref Range Sodium 137 136 - 145 mmol/L Potassium 5.1 3.5 - 5.5 mmol/L Chloride 106 100 - 108 mmol/L  
 CO2 24 21 - 32 mmol/L Anion gap 7 3.0 - 18 mmol/L Glucose 63 (L) 74 - 99 mg/dL  BUN 9 7.0 - 18 MG/DL  
 Creatinine 0.67 0.6 - 1.3 MG/DL  
 BUN/Creatinine ratio 13 12 - 20 GFR est AA >60 >60 ml/min/1.73m2 GFR est non-AA >60 >60 ml/min/1.73m2 Calcium 9.1 8.5 - 10.1 MG/DL  
HEPATIC FUNCTION PANEL Collection Time: 04/30/19  2:57 PM  
Result Value Ref Range Protein, total 6.0 (L) 6.4 - 8.2 g/dL Albumin 2.2 (L) 3.4 - 5.0 g/dL Globulin 3.8 2.0 - 4.0 g/dL A-G Ratio 0.6 (L) 0.8 - 1.7 Bilirubin, total 0.3 0.2 - 1.0 MG/DL Bilirubin, direct <0.1 0.0 - 0.2 MG/DL Alk. phosphatase 170 (H) 45 - 117 U/L  
 AST (SGOT) 49 (H) 15 - 37 U/L  
 ALT (SGPT) 15 13 - 56 U/L  
URIC ACID Collection Time: 04/30/19  2:57 PM  
Result Value Ref Range Uric acid 4.8 2.6 - 7.2 MG/DL  
TYPE & SCREEN Collection Time: 04/30/19  2:57 PM  
Result Value Ref Range Crossmatch Expiration 05/03/2019 ABO/Rh(D) A POSITIVE Antibody screen NEG MAGNESIUM Collection Time: 05/01/19  1:18 AM  
Result Value Ref Range Magnesium 4.8 (H) 1.6 - 2.6 mg/dL MAGNESIUM Collection Time: 05/01/19  7:05 AM  
Result Value Ref Range Magnesium 5.4 (HH) 1.6 - 2.6 mg/dL Assessment and Plan: Active Problems: 
  Elevated blood pressure affecting pregnancy in third trimester, antepartum (4/30/2019) Morbid obesity ([address] (4/30/2019) Hypothyroidism (4/30/2019) Asthma (4/30/2019) At high risk for complications of intrauterine pregnancy (IUP) (4/30/2019) Preeclampsia: Patient on Magnesium Sulfate IV. Blood pressures now in the moderate range. Plan is to complete 48 hour course of steroids to promote fetal lung maturity. ANCS x 1, second dose to be given 1800 today Strict Input and Output and Daily weights Preeclamptic Labs ordered 24 hour urine in progress to be completed 1330. Check 24 hour urine for total Protein Continuous Fetal monitoring until further notice Headache: Patient c/o HA. Treated with Tylenol with moderate improvement. Rates pain now 2/10. Pregnancy-Induced Hypertension:  Continue present management and antepartum care. PIH precautions, SCDs bilateral in progress Bed rest 
Consulted Dr. Thee Starks. Plan to start on daily PO Labetalol 200 mg BID.

## 2019-05-02 LAB
ALBUMIN SERPL-MCNC: 2.2 G/DL (ref 3.4–5)
ALBUMIN SERPL-MCNC: 2.3 G/DL (ref 3.4–5)
ALBUMIN/GLOB SERPL: 0.6 {RATIO} (ref 0.8–1.7)
ALBUMIN/GLOB SERPL: 0.7 {RATIO} (ref 0.8–1.7)
ALP SERPL-CCNC: 153 U/L (ref 45–117)
ALP SERPL-CCNC: 159 U/L (ref 45–117)
ALT SERPL-CCNC: 13 U/L (ref 13–56)
ALT SERPL-CCNC: 14 U/L (ref 13–56)
ANION GAP SERPL CALC-SCNC: 8 MMOL/L (ref 3–18)
ANION GAP SERPL CALC-SCNC: 9 MMOL/L (ref 3–18)
AST SERPL-CCNC: 14 U/L (ref 15–37)
AST SERPL-CCNC: 17 U/L (ref 15–37)
BILIRUB SERPL-MCNC: 0.1 MG/DL (ref 0.2–1)
BILIRUB SERPL-MCNC: 0.2 MG/DL (ref 0.2–1)
BUN SERPL-MCNC: 16 MG/DL (ref 7–18)
BUN SERPL-MCNC: 16 MG/DL (ref 7–18)
BUN/CREAT SERPL: 19 (ref 12–20)
BUN/CREAT SERPL: 23 (ref 12–20)
CALCIUM SERPL-MCNC: 7 MG/DL (ref 8.5–10.1)
CALCIUM SERPL-MCNC: 7.3 MG/DL (ref 8.5–10.1)
CHLORIDE SERPL-SCNC: 105 MMOL/L (ref 100–108)
CHLORIDE SERPL-SCNC: 106 MMOL/L (ref 100–108)
CO2 SERPL-SCNC: 20 MMOL/L (ref 21–32)
CO2 SERPL-SCNC: 22 MMOL/L (ref 21–32)
CREAT SERPL-MCNC: 0.7 MG/DL (ref 0.6–1.3)
CREAT SERPL-MCNC: 0.84 MG/DL (ref 0.6–1.3)
ERYTHROCYTE [DISTWIDTH] IN BLOOD BY AUTOMATED COUNT: 16.1 % (ref 11.6–14.5)
ERYTHROCYTE [DISTWIDTH] IN BLOOD BY AUTOMATED COUNT: 16.4 % (ref 11.6–14.5)
GLOBULIN SER CALC-MCNC: 3.5 G/DL (ref 2–4)
GLOBULIN SER CALC-MCNC: 3.5 G/DL (ref 2–4)
GLUCOSE SERPL-MCNC: 108 MG/DL (ref 74–99)
GLUCOSE SERPL-MCNC: 161 MG/DL (ref 74–99)
HCT VFR BLD AUTO: 23.8 % (ref 35–45)
HCT VFR BLD AUTO: 30.1 % (ref 35–45)
HGB BLD-MCNC: 7.7 G/DL (ref 12–16)
HGB BLD-MCNC: 9.5 G/DL (ref 12–16)
LDH SERPL L TO P-CCNC: 187 U/L (ref 81–234)
LDH SERPL L TO P-CCNC: 198 U/L (ref 81–234)
MAGNESIUM SERPL-MCNC: 5.8 MG/DL (ref 1.6–2.6)
MAGNESIUM SERPL-MCNC: 6.1 MG/DL (ref 1.6–2.6)
MAGNESIUM SERPL-MCNC: 6.2 MG/DL (ref 1.6–2.6)
MCH RBC QN AUTO: 23.2 PG (ref 24–34)
MCH RBC QN AUTO: 23.7 PG (ref 24–34)
MCHC RBC AUTO-ENTMCNC: 31.6 G/DL (ref 31–37)
MCHC RBC AUTO-ENTMCNC: 32.4 G/DL (ref 31–37)
MCV RBC AUTO: 73.2 FL (ref 74–97)
MCV RBC AUTO: 73.4 FL (ref 74–97)
PLATELET # BLD AUTO: 156 K/UL (ref 135–420)
PLATELET # BLD AUTO: 311 K/UL (ref 135–420)
PMV BLD AUTO: 9.2 FL (ref 9.2–11.8)
PMV BLD AUTO: 9.3 FL (ref 9.2–11.8)
POTASSIUM SERPL-SCNC: 4.6 MMOL/L (ref 3.5–5.5)
POTASSIUM SERPL-SCNC: 4.7 MMOL/L (ref 3.5–5.5)
PROT SERPL-MCNC: 5.7 G/DL (ref 6.4–8.2)
PROT SERPL-MCNC: 5.8 G/DL (ref 6.4–8.2)
RBC # BLD AUTO: 3.25 M/UL (ref 4.2–5.3)
RBC # BLD AUTO: 4.1 M/UL (ref 4.2–5.3)
SODIUM SERPL-SCNC: 134 MMOL/L (ref 136–145)
SODIUM SERPL-SCNC: 136 MMOL/L (ref 136–145)
URATE SERPL-MCNC: 5.6 MG/DL (ref 2.6–7.2)
URATE SERPL-MCNC: 5.6 MG/DL (ref 2.6–7.2)
WBC # BLD AUTO: 6.1 K/UL (ref 4.6–13.2)
WBC # BLD AUTO: 9.9 K/UL (ref 4.6–13.2)

## 2019-05-02 PROCEDURE — 83735 ASSAY OF MAGNESIUM: CPT

## 2019-05-02 PROCEDURE — 83615 LACTATE (LD) (LDH) ENZYME: CPT

## 2019-05-02 PROCEDURE — 85027 COMPLETE CBC AUTOMATED: CPT

## 2019-05-02 PROCEDURE — 36415 COLL VENOUS BLD VENIPUNCTURE: CPT

## 2019-05-02 PROCEDURE — 74011250637 HC RX REV CODE- 250/637: Performed by: ADVANCED PRACTICE MIDWIFE

## 2019-05-02 PROCEDURE — 80053 COMPREHEN METABOLIC PANEL: CPT

## 2019-05-02 PROCEDURE — 65270000029 HC RM PRIVATE

## 2019-05-02 PROCEDURE — 74011250636 HC RX REV CODE- 250/636: Performed by: ADVANCED PRACTICE MIDWIFE

## 2019-05-02 PROCEDURE — 74011250637 HC RX REV CODE- 250/637: Performed by: MIDWIFE

## 2019-05-02 PROCEDURE — 84550 ASSAY OF BLOOD/URIC ACID: CPT

## 2019-05-02 PROCEDURE — 74011250636 HC RX REV CODE- 250/636: Performed by: MIDWIFE

## 2019-05-02 RX ADMIN — LABETALOL HCL 200 MG: 200 TABLET, FILM COATED ORAL at 20:19

## 2019-05-02 RX ADMIN — LABETALOL HCL 200 MG: 200 TABLET, FILM COATED ORAL at 08:37

## 2019-05-02 RX ADMIN — FAMOTIDINE 20 MG: 10 INJECTION, SOLUTION INTRAVENOUS at 17:34

## 2019-05-02 RX ADMIN — SODIUM CHLORIDE, SODIUM LACTATE, POTASSIUM CHLORIDE, AND CALCIUM CHLORIDE 75 ML/HR: 600; 310; 30; 20 INJECTION, SOLUTION INTRAVENOUS at 17:24

## 2019-05-02 RX ADMIN — LEVOTHYROXINE SODIUM 175 MCG: 25 TABLET ORAL at 06:03

## 2019-05-02 RX ADMIN — MAGNESIUM SULFATE HEPTAHYDRATE 2 G/HR: 40 INJECTION, SOLUTION INTRAVENOUS at 07:31

## 2019-05-02 RX ADMIN — MAGNESIUM SULFATE HEPTAHYDRATE 2 G/HR: 40 INJECTION, SOLUTION INTRAVENOUS at 16:05

## 2019-05-02 NOTE — PROGRESS NOTES
1915:  Assumed care of pt from Yuko Aguilar RN. Pt in room resting with no c/o pain at this time. Pt currently has mag at 2g and Lr at 75. Plan of Care discussed with pt . Pt expressed understanding 
 
0236:  Critical Result Mag 5.8

## 2019-05-02 NOTE — PROGRESS NOTES
INITIAL NUTRITION ASSESSMENT  
RECOMMENDATIONS/PLAN:  
Diet: Add Gestational DM Monitor labs/lytes, PO intakes, skin integrity, wt, fluid status, BM 
 
REASON FOR ASSESSMENT:  
 
 [x] Pregnant (Not in Labor):  [x] Gestational DM     [] Multigestation NUTRITION ASSESSMENT:  
Client History: 34 yrs old Female admitted with  labor on bedrest    
PMHx: anemia, asthma, miscarriage, morbid obesity. Thyroid disease Cultural/Jainism Food Preferences: None Identified FOOD/NUTRITION HISTORY Diet History: unable to obtain at this time Food Allergies:  [x] NKFA Pertinent PTA Medications: prenatal  MVI, vit d3, synthroid NUTRITION INTAKE Diet Order:  Regular Average PO Intake:      
No data found. Pertinent Medications:  [x] Reviewed; keppra, synthroid, magnesium sulfate, Electrolyte Replacement Protocol: []K  []Mg  []PO4 Insulin:  [] SSI  [] Pre-meal   []  Basal   [] Drip  [] None Pt expected to meet estimated nutrient needs through next review:          [x]  Yes     [] No; ANTHROPOMETRICS Height: 5' 3\" (160 cm)       Weight: 127 kg (280 lb) BMI: 49.6 kg/m^2  -  morbidly obese (Greater than or = to 40% BMI) Weight change: no wt loss noted Comparison to Reference Standards: IBW: 115 lbs      %IBW: 243%      AdjBW: 71 kg NUTRITION-FOCUSED PHYSICAL ASSESSMENT Skin: No PU. GI: No BM 
 
BIOCHEMICAL DATA & MEDICAL TESTS Pertinent Labs:  [x] Reviewed; Mg-6.1, NUTRITION PRESCRIPTION Calories: 3048 kcal/day based on 24kcal/kg Protein: 140 g/day based on 1.1 g/kg CHO: 381 g/day based on 50% of total energy Fluid: 3048 ml/day based on 1 kcal/ml NUTRITION DIAGNOSES:  
1. At risk for altered nutritional lab values related to gestational DM as evidence by MST screen for gestational DM. NUTRITION INTERVENTIONS:  
INTERVENTIONS:        GOALS: 
1. Diet: Add Gestational DM 1.  Encourage PO intake >50% at most meals by next review 5 days LEARNING NEEDS (Diet, Supplementation, Food/Nutrient-Drug Interaction): Will defer to glycemic control team  
 
NUTRITION MONITORING /EVALUATION:  
Follow PO intake Monitor wt Monitor renal labs, electrolytes, fluid status 
 
[] Participated in Interdisciplinary Rounds 
[x] [address] [place] [address] Reviewed/Documented DISCHARGE NUTRITION RECOMMENDATIONS ADDRESSED:  
   [x] To be determined closer to discharge NUTRITION RISK:     [x]  At risk                     []  Not currently at risk Will follow-up per policy. Rex Cranker, [address]

## 2019-05-02 NOTE — PROGRESS NOTES
Ante Partum Progress Note Mayank Guerrero 
34w1d Assessment: 34w1d Preeclampsia severe Plan:  Continue hospitalization with hospitalized bedrest 
           Continue magnesium and PO labetalol Discussed plan of care with Dr. Shilpa Howell and Dr. Shilpa Howell assessed patient. ANCS will be matured at 1800 today. Dr. Shilpa Howell to consult with MFM to determine plan of care, but will likely proceed with IOL/cook balloon insertion. Orders/Charges: Medium Patient states she does not have headache , abdominal pain  , contractions, right upper quadrant pain  , vaginal bleeding  and vaginal leaking of fluid Vitals: 
Visit Vitals BP (!) 161/99 Pulse 90 Temp 98.4 °F (36.9 °C) Resp 20 Ht 5' 3\" (1.6 m) Wt 127 kg (280 lb) SpO2 100% Breastfeeding? No  
BMI 49.60 kg/m² Temp (24hrs), Av.4 °F (36.9 °C), Min:98.2 °F (36.8 °C), Max:98.7 °F (37.1 °C) Last 24hr Input/Output: 
 
Intake/Output Summary (Last 24 hours) at 2019 0910 Last data filed at 2019 6405 Gross per 24 hour Intake 5173.75 ml Output 3975 ml Net 1198.75 ml Non stress test:  Reactive Uterine Activity: None Exam:  Patient without distress. Abdomen, fundus soft non-tender Extremities, no redness or tenderness. SCDs in place, 3+ edema CERVIX; Cervical Exam 
Membrane Status: Intact Additional Exam: Patient without distress, Abdomen soft, non-tender, Fundus soft and non tender, Right upper quadrant non-tender, Perineum No sign of blood or amniotic fluid, Lower extremities edema 3+, Patellar Reflexes: 1+ bilaterally, Clonus: absent, Dilation: 0 cm, Effacement: 25% Not Checked and Station: -2 Vertex verified with bedside ultrasound. Labs:  
 
Lab Results Component Value Date/Time  WBC 6.1 2019 01:20 AM  
 WBC 10.1 2019 01:55 PM  
 WBC 7.7 2019 02:57 PM  
 HGB 7.7 (L) 2019 01:20 AM  
 HGB 9.9 (L) 2019 01:55 PM  
 HGB 10.1 (L) 04/30/2019 02:57 PM  
 HCT 23.8 (L) 05/02/2019 01:20 AM  
 HCT 31.2 (L) 05/01/2019 01:55 PM  
 HCT 31.6 (L) 04/30/2019 02:57 PM  
 PLATELET 193 03/68/3351 01:20 AM  
 PLATELET 281 90/61/1798 01:55 PM  
 PLATELET 243 91/20/3115 02:57 PM  
 
 
Recent Results (from the past 24 hour(s)) MAGNESIUM Collection Time: 05/01/19  1:05 PM  
Result Value Ref Range Magnesium 6.1 (HH) 1.6 - 2.6 mg/dL METABOLIC PANEL, COMPREHENSIVE Collection Time: 05/01/19  1:05 PM  
Result Value Ref Range Sodium 136 136 - 145 mmol/L Potassium 4.9 3.5 - 5.5 mmol/L Chloride 106 100 - 108 mmol/L  
 CO2 18 (L) 21 - 32 mmol/L Anion gap 12 3.0 - 18 mmol/L Glucose 100 (H) 74 - 99 mg/dL BUN 12 7.0 - 18 MG/DL Creatinine 0.93 0.6 - 1.3 MG/DL  
 BUN/Creatinine ratio 13 12 - 20 GFR est AA >60 >60 ml/min/1.73m2 GFR est non-AA >60 >60 ml/min/1.73m2 Calcium 8.0 (L) 8.5 - 10.1 MG/DL Bilirubin, total 0.2 0.2 - 1.0 MG/DL  
 ALT (SGPT) 13 13 - 56 U/L  
 AST (SGOT) 17 15 - 37 U/L Alk. phosphatase 164 (H) 45 - 117 U/L Protein, total 5.8 (L) 6.4 - 8.2 g/dL Albumin 2.2 (L) 3.4 - 5.0 g/dL Globulin 3.6 2.0 - 4.0 g/dL A-G Ratio 0.6 (L) 0.8 - 1.7 URIC ACID Collection Time: 05/01/19  1:05 PM  
Result Value Ref Range Uric acid 5.9 2.6 - 7.2 MG/DL  
LD Collection Time: 05/01/19  1:05 PM  
Result Value Ref Range  81 - 234 U/L  
CBC W/O DIFF Collection Time: 05/01/19  1:55 PM  
Result Value Ref Range WBC 10.1 4.6 - 13.2 K/uL  
 RBC 4.31 4.20 - 5.30 M/uL HGB 9.9 (L) 12.0 - 16.0 g/dL HCT 31.2 (L) 35.0 - 45.0 % MCV 72.4 (L) 74.0 - 97.0 FL  
 MCH 23.0 (L) 24.0 - 34.0 PG  
 MCHC 31.7 31.0 - 37.0 g/dL  
 RDW 16.1 (H) 11.6 - 14.5 % PLATELET 664 380 - 055 K/uL MPV 9.3 9.2 - 11.8 FL  
MAGNESIUM Collection Time: 05/01/19  6:55 PM  
Result Value Ref Range Magnesium 5.9 (HH) 1.6 - 2.6 mg/dL CBC W/O DIFF  Collection Time: 05/02/19  1:20 AM  
 Result Value Ref Range WBC 6.1 4.6 - 13.2 K/uL  
 RBC 3.25 (L) 4.20 - 5.30 M/uL HGB 7.7 (L) 12.0 - 16.0 g/dL HCT 23.8 (L) 35.0 - 45.0 % MCV 73.2 (L) 74.0 - 97.0 FL  
 MCH 23.7 (L) 24.0 - 34.0 PG  
 MCHC 32.4 31.0 - 37.0 g/dL  
 RDW 16.1 (H) 11.6 - 14.5 % PLATELET 667 010 - 697 K/uL MPV 9.3 9.2 - 32.2 FL  
METABOLIC PANEL, COMPREHENSIVE Collection Time: 05/02/19  1:20 AM  
Result Value Ref Range Sodium 134 (L) 136 - 145 mmol/L Potassium 4.7 3.5 - 5.5 mmol/L Chloride 105 100 - 108 mmol/L  
 CO2 20 (L) 21 - 32 mmol/L Anion gap 9 3.0 - 18 mmol/L Glucose 161 (H) 74 - 99 mg/dL BUN 16 7.0 - 18 MG/DL Creatinine 0.84 0.6 - 1.3 MG/DL  
 BUN/Creatinine ratio 19 12 - 20 GFR est AA >60 >60 ml/min/1.73m2 GFR est non-AA >60 >60 ml/min/1.73m2 Calcium 7.3 (L) 8.5 - 10.1 MG/DL Bilirubin, total 0.1 (L) 0.2 - 1.0 MG/DL  
 ALT (SGPT) 14 13 - 56 U/L  
 AST (SGOT) 17 15 - 37 U/L Alk. phosphatase 153 (H) 45 - 117 U/L Protein, total 5.7 (L) 6.4 - 8.2 g/dL Albumin 2.2 (L) 3.4 - 5.0 g/dL Globulin 3.5 2.0 - 4.0 g/dL A-G Ratio 0.6 (L) 0.8 - 1.7 URIC ACID Collection Time: 05/02/19  1:20 AM  
Result Value Ref Range Uric acid 5.6 2.6 - 7.2 MG/DL MAGNESIUM Collection Time: 05/02/19  1:20 AM  
Result Value Ref Range Magnesium 5.8 (HH) 1.6 - 2.6 mg/dL

## 2019-05-02 NOTE — PROGRESS NOTES
OB Labor Note Assessment: 
 IUP for IOL d/t pre-eclampsia severe Plan: 
 Continue magnesium Oral labetalol COOK inserted with 80/80mL sterile saline per  guidelines Serial BP monitoring Subjective: 
 Pt. does not have any complaints. Pt. is feeling contractions. Pt. is feeling fetal movement. Objective: 
  
Visit Vitals /83 Pulse 90 Temp 98.2 °F (36.8 °C) Resp 16 Ht 5' 3\" (1.6 m) Wt 127 kg (280 lb) SpO2 100% Breastfeeding? No  
BMI 49.60 kg/m² Cervical Exam 
Membrane Status: Intact  
0.5/th/high EFM: reassuring Bala: no contractions BPs stable ] Denies headaches, vision changes and epigastric pain Patient Vitals for the past 4 hrs: Mode Fetal Heart Rate Variability Decelerations Accelerations RN Reviewed Strip? Non Stress Test  
05/02/19 1245      Yes   
05/02/19 1230 External 115 (!) Less than or equal to 5 BPM None No    
05/02/19 1215      Yes   
05/02/19 1200 External 115 (!) Less than or equal to 5 BPM None No    
05/02/19 1145      Yes   
05/02/19 1130 External 115 6-25 BPM None Yes  Reactive 05/02/19 1100 External 115 6-25 BPM None Yes  Reactive 5/2/2019 2:59 PM

## 2019-05-02 NOTE — ROUTINE PROCESS
Bedside and Verbal shift change report given to [address] [place] [address], RN (oncoming nurse) by Ovidio Hernandez RN (offgoing nurse). Report included the following information SBAR, Kardex, Intake/Output, MAR, Recent Results.

## 2019-05-02 NOTE — PROGRESS NOTES
Bedside and Verbal shift change report given to PATIENCE Brooks RN (oncoming nurse) by CLYDE King RN (offgoing nurse). Report included the following information SBAR, Kardex, Procedure Summary, Intake/Output, MAR and Recent Results. Assessment completed. Mike Junior CNM on unit. Labs reviewed with decreasing platelets. 4975 No magnesium result for 0700. Spoke with lab. They will come upstairs and draw magnesium and PIH labs. 1030 Labs reviewed. 1225 Pt assisted to right lateral side. 1445 K. Vesta Bottoms at bedside. Starting induction for pre-eclampsia. Conor [place] balloon placed with 80 ml of NS in uterine and vaginal balloon. Pt tolerated procedure well. 1930 Bedside and Verbal shift change report given to J. Madalynn Oppenheim (oncoming nurse) by Noemy Echevarria (offgoing nurse). Report included the following information SBAR, Kardex, Procedure Summary, Intake/Output, MAR and Recent Results.

## 2019-05-03 ENCOUNTER — ANESTHESIA EVENT (OUTPATIENT)
Dept: LABOR AND DELIVERY | Age: 34
DRG: 540 | End: 2019-05-03
Payer: MEDICAID

## 2019-05-03 ENCOUNTER — ANESTHESIA (OUTPATIENT)
Dept: LABOR AND DELIVERY | Age: 34
DRG: 540 | End: 2019-05-03
Payer: MEDICAID

## 2019-05-03 LAB
ABO + RH BLD: NORMAL
ALBUMIN SERPL-MCNC: 2.1 G/DL (ref 3.4–5)
ALBUMIN/GLOB SERPL: 0.6 {RATIO} (ref 0.8–1.7)
ALP SERPL-CCNC: 148 U/L (ref 45–117)
ALT SERPL-CCNC: 14 U/L (ref 13–56)
ANION GAP SERPL CALC-SCNC: 8 MMOL/L (ref 3–18)
AST SERPL-CCNC: 14 U/L (ref 15–37)
BILIRUB SERPL-MCNC: 0.2 MG/DL (ref 0.2–1)
BLOOD GROUP ANTIBODIES SERPL: NORMAL
BUN SERPL-MCNC: 16 MG/DL (ref 7–18)
BUN/CREAT SERPL: 21 (ref 12–20)
CALCIUM SERPL-MCNC: 6.5 MG/DL (ref 8.5–10.1)
CHLORIDE SERPL-SCNC: 105 MMOL/L (ref 100–108)
CO2 SERPL-SCNC: 23 MMOL/L (ref 21–32)
CREAT SERPL-MCNC: 0.76 MG/DL (ref 0.6–1.3)
ERYTHROCYTE [DISTWIDTH] IN BLOOD BY AUTOMATED COUNT: 16.8 % (ref 11.6–14.5)
GLOBULIN SER CALC-MCNC: 3.3 G/DL (ref 2–4)
GLUCOSE SERPL-MCNC: 80 MG/DL (ref 74–99)
HCT VFR BLD AUTO: 30.1 % (ref 35–45)
HGB BLD-MCNC: 9.2 G/DL (ref 12–16)
LDH SERPL L TO P-CCNC: 173 U/L (ref 81–234)
MAGNESIUM SERPL-MCNC: 6.1 MG/DL (ref 1.6–2.6)
MAGNESIUM SERPL-MCNC: 6.3 MG/DL (ref 1.6–2.6)
MAGNESIUM SERPL-MCNC: 6.6 MG/DL (ref 1.6–2.6)
MAGNESIUM SERPL-MCNC: 6.6 MG/DL (ref 1.6–2.6)
MCH RBC QN AUTO: 22.8 PG (ref 24–34)
MCHC RBC AUTO-ENTMCNC: 30.6 G/DL (ref 31–37)
MCV RBC AUTO: 74.5 FL (ref 74–97)
PLATELET # BLD AUTO: 280 K/UL (ref 135–420)
PMV BLD AUTO: 9.2 FL (ref 9.2–11.8)
POTASSIUM SERPL-SCNC: 4.8 MMOL/L (ref 3.5–5.5)
PROT SERPL-MCNC: 5.4 G/DL (ref 6.4–8.2)
RBC # BLD AUTO: 4.04 M/UL (ref 4.2–5.3)
SODIUM SERPL-SCNC: 136 MMOL/L (ref 136–145)
SPECIMEN EXP DATE BLD: NORMAL
URATE SERPL-MCNC: 5.1 MG/DL (ref 2.6–7.2)
WBC # BLD AUTO: 11.4 K/UL (ref 4.6–13.2)

## 2019-05-03 PROCEDURE — 77030002916 HC SUT ETHLN J&J -A: Performed by: OBSTETRICS & GYNECOLOGY

## 2019-05-03 PROCEDURE — 75410000003 HC RECOV DEL/VAG/CSECN EA 0.5 HR: Performed by: OBSTETRICS & GYNECOLOGY

## 2019-05-03 PROCEDURE — 77030002933 HC SUT MCRYL J&J -A: Performed by: OBSTETRICS & GYNECOLOGY

## 2019-05-03 PROCEDURE — 77030032490 HC SLV COMPR SCD KNE COVD -B: Performed by: OBSTETRICS & GYNECOLOGY

## 2019-05-03 PROCEDURE — 83735 ASSAY OF MAGNESIUM: CPT

## 2019-05-03 PROCEDURE — 76010000392 HC C SECN EA ADDL 0.5 HR: Performed by: OBSTETRICS & GYNECOLOGY

## 2019-05-03 PROCEDURE — 74011000258 HC RX REV CODE- 258

## 2019-05-03 PROCEDURE — 77030010547 HC BG URIN W/UMETER -A

## 2019-05-03 PROCEDURE — 74011250636 HC RX REV CODE- 250/636

## 2019-05-03 PROCEDURE — 10907ZC DRAINAGE OF AMNIOTIC FLUID, THERAPEUTIC FROM PRODUCTS OF CONCEPTION, VIA NATURAL OR ARTIFICIAL OPENING: ICD-10-PCS | Performed by: OBSTETRICS & GYNECOLOGY

## 2019-05-03 PROCEDURE — 84550 ASSAY OF BLOOD/URIC ACID: CPT

## 2019-05-03 PROCEDURE — 65270000029 HC RM PRIVATE

## 2019-05-03 PROCEDURE — 86900 BLOOD TYPING SEROLOGIC ABO: CPT

## 2019-05-03 PROCEDURE — 77030031139 HC SUT VCRL2 J&J -A: Performed by: OBSTETRICS & GYNECOLOGY

## 2019-05-03 PROCEDURE — 74011000258 HC RX REV CODE- 258: Performed by: MIDWIFE

## 2019-05-03 PROCEDURE — 77030011265 HC ELECTRD BLD HEX COVD -A: Performed by: OBSTETRICS & GYNECOLOGY

## 2019-05-03 PROCEDURE — 74011250636 HC RX REV CODE- 250/636: Performed by: ADVANCED PRACTICE MIDWIFE

## 2019-05-03 PROCEDURE — 77030010848 HC CATH INTUTR PRSS KOLB -B

## 2019-05-03 PROCEDURE — 83615 LACTATE (LD) (LDH) ENZYME: CPT

## 2019-05-03 PROCEDURE — 74011250636 HC RX REV CODE- 250/636: Performed by: MIDWIFE

## 2019-05-03 PROCEDURE — 77030014144 HC TY SPN ANES BBMI -B: Performed by: ANESTHESIOLOGY

## 2019-05-03 PROCEDURE — 74011250637 HC RX REV CODE- 250/637: Performed by: ADVANCED PRACTICE MIDWIFE

## 2019-05-03 PROCEDURE — 77010026065 HC OXYGEN MINIMUM MEDICAL AIR

## 2019-05-03 PROCEDURE — 36415 COLL VENOUS BLD VENIPUNCTURE: CPT

## 2019-05-03 PROCEDURE — 80053 COMPREHEN METABOLIC PANEL: CPT

## 2019-05-03 PROCEDURE — 76060000078 HC EPIDURAL ANESTHESIA

## 2019-05-03 PROCEDURE — 85027 COMPLETE CBC AUTOMATED: CPT

## 2019-05-03 PROCEDURE — 76060000034 HC ANESTHESIA 1.5 TO 2 HR: Performed by: OBSTETRICS & GYNECOLOGY

## 2019-05-03 PROCEDURE — 74011250637 HC RX REV CODE- 250/637

## 2019-05-03 PROCEDURE — 74011000250 HC RX REV CODE- 250

## 2019-05-03 PROCEDURE — 77030018836 HC SOL IRR NACL ICUM -A: Performed by: OBSTETRICS & GYNECOLOGY

## 2019-05-03 PROCEDURE — 77030009413 HC ELECTRD SCALP COVD -A

## 2019-05-03 PROCEDURE — 75410000002 HC LABOR FEE PER 1 HR

## 2019-05-03 PROCEDURE — 75410000003 HC RECOV DEL/VAG/CSECN EA 0.5 HR

## 2019-05-03 PROCEDURE — 74011250637 HC RX REV CODE- 250/637: Performed by: MIDWIFE

## 2019-05-03 PROCEDURE — 76010000391 HC C SECN FIRST 1 HR: Performed by: OBSTETRICS & GYNECOLOGY

## 2019-05-03 PROCEDURE — 77030033269 HC SLV COMPR SCD KNE2 CARD -B

## 2019-05-03 RX ORDER — TRISODIUM CITRATE DIHYDRATE AND CITRIC ACID MONOHYDRATE 500; 334 MG/5ML; MG/5ML
SOLUTION ORAL AS NEEDED
Status: DISCONTINUED | OUTPATIENT
Start: 2019-05-03 | End: 2019-05-03 | Stop reason: HOSPADM

## 2019-05-03 RX ORDER — MINERAL OIL
OIL (ML) ORAL
Status: DISCONTINUED
Start: 2019-05-03 | End: 2019-05-04 | Stop reason: WASHOUT

## 2019-05-03 RX ORDER — OXYTOCIN/RINGER'S LACTATE 20/1000 ML
PLASTIC BAG, INJECTION (ML) INTRAVENOUS
Status: COMPLETED
Start: 2019-05-03 | End: 2019-05-03

## 2019-05-03 RX ORDER — OXYTOCIN 10 [USP'U]/ML
INJECTION, SOLUTION INTRAMUSCULAR; INTRAVENOUS AS NEEDED
Status: DISCONTINUED | OUTPATIENT
Start: 2019-05-03 | End: 2019-05-03 | Stop reason: HOSPADM

## 2019-05-03 RX ORDER — BUPIVACAINE HYDROCHLORIDE 7.5 MG/ML
INJECTION, SOLUTION INTRASPINAL
Status: COMPLETED | OUTPATIENT
Start: 2019-05-03 | End: 2019-05-03

## 2019-05-03 RX ORDER — CEFAZOLIN SODIUM 2 G/50ML
2 SOLUTION INTRAVENOUS ONCE
Status: DISCONTINUED | OUTPATIENT
Start: 2019-05-03 | End: 2019-05-03

## 2019-05-03 RX ORDER — MORPHINE SULFATE 0.5 MG/ML
INJECTION, SOLUTION EPIDURAL; INTRATHECAL; INTRAVENOUS
Status: COMPLETED | OUTPATIENT
Start: 2019-05-03 | End: 2019-05-03

## 2019-05-03 RX ORDER — METOCLOPRAMIDE HYDROCHLORIDE 5 MG/ML
INJECTION INTRAMUSCULAR; INTRAVENOUS AS NEEDED
Status: DISCONTINUED | OUTPATIENT
Start: 2019-05-03 | End: 2019-05-03 | Stop reason: HOSPADM

## 2019-05-03 RX ORDER — PHENYLEPHRINE HCL IN 0.9% NACL 1 MG/10 ML
SYRINGE (ML) INTRAVENOUS AS NEEDED
Status: DISCONTINUED | OUTPATIENT
Start: 2019-05-03 | End: 2019-05-03 | Stop reason: HOSPADM

## 2019-05-03 RX ORDER — OXYTOCIN/RINGER'S LACTATE 20/1000 ML
PLASTIC BAG, INJECTION (ML) INTRAVENOUS
Status: DISPENSED
Start: 2019-05-03 | End: 2019-05-04

## 2019-05-03 RX ORDER — ONDANSETRON 2 MG/ML
INJECTION INTRAMUSCULAR; INTRAVENOUS AS NEEDED
Status: DISCONTINUED | OUTPATIENT
Start: 2019-05-03 | End: 2019-05-03 | Stop reason: HOSPADM

## 2019-05-03 RX ORDER — OXYTOCIN/0.9 % SODIUM CHLORIDE 30/500 ML
0-25 PLASTIC BAG, INJECTION (ML) INTRAVENOUS
Status: DISCONTINUED | OUTPATIENT
Start: 2019-05-03 | End: 2019-05-06 | Stop reason: HOSPADM

## 2019-05-03 RX ORDER — LIDOCAINE HYDROCHLORIDE 10 MG/ML
INJECTION, SOLUTION EPIDURAL; INFILTRATION; INTRACAUDAL; PERINEURAL
Status: DISCONTINUED
Start: 2019-05-03 | End: 2019-05-04 | Stop reason: WASHOUT

## 2019-05-03 RX ORDER — TRANEXAMIC ACID 100 MG/ML
INJECTION, SOLUTION INTRAVENOUS AS NEEDED
Status: DISCONTINUED | OUTPATIENT
Start: 2019-05-03 | End: 2019-05-03 | Stop reason: HOSPADM

## 2019-05-03 RX ORDER — OXYTOCIN/RINGER'S LACTATE 20/1000 ML
PLASTIC BAG, INJECTION (ML) INTRAVENOUS AS NEEDED
Status: DISCONTINUED | OUTPATIENT
Start: 2019-05-03 | End: 2019-05-03 | Stop reason: HOSPADM

## 2019-05-03 RX ORDER — SODIUM CHLORIDE, SODIUM LACTATE, POTASSIUM CHLORIDE, CALCIUM CHLORIDE 600; 310; 30; 20 MG/100ML; MG/100ML; MG/100ML; MG/100ML
INJECTION, SOLUTION INTRAVENOUS
Status: DISCONTINUED | OUTPATIENT
Start: 2019-05-03 | End: 2019-05-03 | Stop reason: HOSPADM

## 2019-05-03 RX ORDER — MORPHINE SULFATE 1 MG/ML
INJECTION, SOLUTION EPIDURAL; INTRATHECAL; INTRAVENOUS AS NEEDED
Status: DISCONTINUED | OUTPATIENT
Start: 2019-05-03 | End: 2019-05-03

## 2019-05-03 RX ADMIN — TRISODIUM CITRATE DIHYDRATE AND CITRIC ACID MONOHYDRATE 30 ML: 500; 334 SOLUTION ORAL at 21:13

## 2019-05-03 RX ADMIN — Medication 100 MCG: at 21:36

## 2019-05-03 RX ADMIN — MAGNESIUM SULFATE HEPTAHYDRATE 2 G/HR: 40 INJECTION, SOLUTION INTRAVENOUS at 07:18

## 2019-05-03 RX ADMIN — PENICILLIN G POTASSIUM 2.5 MILLION UNITS: 20000000 POWDER, FOR SOLUTION INTRAVENOUS at 12:02

## 2019-05-03 RX ADMIN — MORPHINE SULFATE 0.2 MG: 0.5 INJECTION, SOLUTION EPIDURAL; INTRATHECAL; INTRAVENOUS at 21:36

## 2019-05-03 RX ADMIN — SODIUM CHLORIDE, SODIUM LACTATE, POTASSIUM CHLORIDE, AND CALCIUM CHLORIDE 75 ML/HR: 600; 310; 30; 20 INJECTION, SOLUTION INTRAVENOUS at 17:33

## 2019-05-03 RX ADMIN — LABETALOL HCL 200 MG: 200 TABLET, FILM COATED ORAL at 08:17

## 2019-05-03 RX ADMIN — METOCLOPRAMIDE HYDROCHLORIDE 10 MG: 5 INJECTION INTRAMUSCULAR; INTRAVENOUS at 21:28

## 2019-05-03 RX ADMIN — Medication 2 MILLI-UNITS/MIN: at 02:00

## 2019-05-03 RX ADMIN — LEVOTHYROXINE SODIUM 175 MCG: 25 TABLET ORAL at 06:27

## 2019-05-03 RX ADMIN — SODIUM CHLORIDE 5 MILLION UNITS: 900 INJECTION INTRAVENOUS at 08:02

## 2019-05-03 RX ADMIN — Medication 495 ML: at 22:41

## 2019-05-03 RX ADMIN — SODIUM CHLORIDE, SODIUM LACTATE, POTASSIUM CHLORIDE, CALCIUM CHLORIDE: 600; 310; 30; 20 INJECTION, SOLUTION INTRAVENOUS at 21:52

## 2019-05-03 RX ADMIN — SODIUM CHLORIDE, SODIUM LACTATE, POTASSIUM CHLORIDE, AND CALCIUM CHLORIDE 75 ML/HR: 600; 310; 30; 20 INJECTION, SOLUTION INTRAVENOUS at 05:37

## 2019-05-03 RX ADMIN — Medication 100 MCG: at 21:49

## 2019-05-03 RX ADMIN — CEFAZOLIN SODIUM 3 G: 10 INJECTION, POWDER, FOR SOLUTION INTRAVENOUS at 21:28

## 2019-05-03 RX ADMIN — Medication 100 MCG: at 21:51

## 2019-05-03 RX ADMIN — TRANEXAMIC ACID 1 G: 100 INJECTION, SOLUTION INTRAVENOUS at 22:07

## 2019-05-03 RX ADMIN — PENICILLIN G POTASSIUM 2.5 MILLION UNITS: 20000000 POWDER, FOR SOLUTION INTRAVENOUS at 15:59

## 2019-05-03 RX ADMIN — SODIUM CHLORIDE, SODIUM LACTATE, POTASSIUM CHLORIDE, CALCIUM CHLORIDE: 600; 310; 30; 20 INJECTION, SOLUTION INTRAVENOUS at 21:17

## 2019-05-03 RX ADMIN — ONDANSETRON 4 MG: 2 INJECTION INTRAMUSCULAR; INTRAVENOUS at 22:18

## 2019-05-03 RX ADMIN — BUPIVACAINE HYDROCHLORIDE 13 MG: 7.5 INJECTION, SOLUTION INTRASPINAL at 21:36

## 2019-05-03 RX ADMIN — OXYTOCIN 10 UNITS: 10 INJECTION, SOLUTION INTRAMUSCULAR; INTRAVENOUS at 22:04

## 2019-05-03 RX ADMIN — Medication 5 ML: at 22:01

## 2019-05-03 RX ADMIN — MAGNESIUM SULFATE HEPTAHYDRATE 2 G/HR: 40 INJECTION, SOLUTION INTRAVENOUS at 13:18

## 2019-05-03 RX ADMIN — Medication 100 MCG: at 21:45

## 2019-05-03 NOTE — PROGRESS NOTES
Problem: Vaginal Delivery: Day of Deliver-Laboring Goal: Off Pathway (Use only if patient is Off Pathway) Outcome: Progressing Towards Goal 
Goal: Activity/Safety Outcome: Progressing Towards Goal 
Goal: Consults, if ordered Outcome: Progressing Towards Goal 
Goal: Diagnostic Test/Procedures Outcome: Progressing Towards Goal 
Goal: Nutrition/Diet Outcome: Progressing Towards Goal 
Goal: Discharge Planning Outcome: Progressing Towards Goal 
Goal: Medications Outcome: Progressing Towards Goal 
Goal: Respiratory Outcome: Progressing Towards Goal 
Goal: Treatments/Interventions/Procedures Outcome: Progressing Towards Goal 
Goal: *Vital signs within defined limits Outcome: Progressing Towards Goal 
Goal: *Labs within defined limits Outcome: Progressing Towards Goal 
Goal: *Hemodynamically stable Outcome: Progressing Towards Goal 
Goal: *Optimal pain control at patient's stated goal 
Outcome: Progressing Towards Goal

## 2019-05-03 NOTE — PROGRESS NOTES
Bedside shift change report given to LOC Canas (oncoming nurse) by Alley Stroud. Monica Maki RN (offgoing nurse). Report included the following information SBAR, Kardex, Intake/Output, MAR, Recent Results, Alarm Parameters  and Quality Measures.

## 2019-05-03 NOTE — PROGRESS NOTES
Labor Progress Note Patient seen, fetal heart rate and contraction pattern evaluated, patient examined. Patient Vitals for the past 1 hrs: 
 BP Temp Pulse Resp SpO2  
05/03/19 1200 142/87 98.3 °F (36.8 °C) 79 18 99 % Physical Exam: 
Cervical Exam: 3-4} cm dilated 50% effaced   
-3 station Membranes:  Intact Uterine Activity: Frequency: Every 8 minutes, Duration: 60 seconds and Intensity: mild Fetal Heart Rate: Baseline: 120 per minute Variability: moderate Accelerations: no 
Decelerations: none Assessment/Plan: 
Reassuring fetal status SVE 3-4 cm, but fetal head high and ballotable. Pitocin at 20mu/min. Will reassess in 2 hours and re-attempt AROM at that time IV pain meds/PEDRO per patient request 
Excell Party STEFFANY

## 2019-05-03 NOTE — PROGRESS NOTES
Problem: Falls - Risk of 
Goal: *Absence of Falls Description Document Easter Getting Fall Risk and appropriate interventions in the flowsheet. Outcome: Progressing Towards Goal 
  
Problem: Patient Education: Go to Patient Education Activity Goal: Patient/Family Education Outcome: Progressing Towards Goal 
  
Problem: Pain Goal: *Control of Pain Outcome: Progressing Towards Goal 
  
Problem: Patient Education: Go to Patient Education Activity Goal: Patient/Family Education Outcome: Progressing Towards Goal

## 2019-05-03 NOTE — PROGRESS NOTES
Problem: Falls - Risk of 
Goal: *Absence of Falls Description Document Peter Long Fall Risk and appropriate interventions in the flowsheet. Outcome: Progressing Towards Goal 
  
Problem: Patient Education: Go to Patient Education Activity Goal: Patient/Family Education Outcome: Progressing Towards Goal 
  
Problem: Pain Goal: *Control of Pain Outcome: Progressing Towards Goal 
  
Problem: Patient Education: Go to Patient Education Activity Goal: Patient/Family Education Outcome: Progressing Towards Goal

## 2019-05-03 NOTE — PROGRESS NOTES
Labor Progress Note Patient seen, fetal heart rate and contraction pattern evaluated, patient examined. Patient Vitals for the past 1 hrs: 
 BP Pulse 05/03/19 1430 140/87 76 Physical Exam: 
Cervical Exam:  4 cm dilated 80% effaced   
-2 station Presenting Part: cephalic Membranes:  Artificial Rupture of Membranes; Amniotic Fluid: large amount of clear fluid Uterine Activity: Frequency: Every 2-4 minutes, Duration: 60-80 seconds and Intensity: strong Fetal Heart Rate: Baseline: 125 per minute Variability: moderate Accelerations: yes Decelerations: none Assessment/Plan: 
Reassuring fetal status, Labor  Progressing normally, Continue plan for vaginal delivery. Confirmed vtx by US. AROM/ IUPC placed for better management of pitocin. Currently infusing at 20 mu/min. Mirella Mcnally CNM

## 2019-05-04 LAB
HCT VFR BLD AUTO: 28.6 % (ref 35–45)
HGB BLD-MCNC: 8.8 G/DL (ref 12–16)
MAGNESIUM SERPL-MCNC: 5.9 MG/DL (ref 1.6–2.6)
MAGNESIUM SERPL-MCNC: 6.6 MG/DL (ref 1.6–2.6)
MAGNESIUM SERPL-MCNC: 6.7 MG/DL (ref 1.6–2.6)
MAGNESIUM SERPL-MCNC: 7.1 MG/DL (ref 1.6–2.6)

## 2019-05-04 PROCEDURE — 65270000029 HC RM PRIVATE

## 2019-05-04 PROCEDURE — 36415 COLL VENOUS BLD VENIPUNCTURE: CPT

## 2019-05-04 PROCEDURE — 85014 HEMATOCRIT: CPT

## 2019-05-04 PROCEDURE — 74011250637 HC RX REV CODE- 250/637: Performed by: ADVANCED PRACTICE MIDWIFE

## 2019-05-04 PROCEDURE — 74011250636 HC RX REV CODE- 250/636: Performed by: MIDWIFE

## 2019-05-04 PROCEDURE — 83735 ASSAY OF MAGNESIUM: CPT

## 2019-05-04 PROCEDURE — 88307 TISSUE EXAM BY PATHOLOGIST: CPT

## 2019-05-04 PROCEDURE — 74011250637 HC RX REV CODE- 250/637: Performed by: MIDWIFE

## 2019-05-04 PROCEDURE — 85018 HEMOGLOBIN: CPT

## 2019-05-04 RX ORDER — ZOLPIDEM TARTRATE 5 MG/1
5 TABLET ORAL
Status: DISCONTINUED | OUTPATIENT
Start: 2019-05-04 | End: 2019-05-06 | Stop reason: HOSPADM

## 2019-05-04 RX ORDER — OXYCODONE HYDROCHLORIDE 5 MG/1
5 TABLET ORAL
Status: ACTIVE | OUTPATIENT
Start: 2019-05-04 | End: 2019-05-05

## 2019-05-04 RX ORDER — SIMETHICONE 80 MG
80 TABLET,CHEWABLE ORAL
Status: DISCONTINUED | OUTPATIENT
Start: 2019-05-04 | End: 2019-05-06 | Stop reason: HOSPADM

## 2019-05-04 RX ORDER — LANOLIN ALCOHOL/MO/W.PET/CERES
1 CREAM (GRAM) TOPICAL
Status: DISCONTINUED | OUTPATIENT
Start: 2019-05-04 | End: 2019-05-06 | Stop reason: HOSPADM

## 2019-05-04 RX ORDER — ACETAMINOPHEN 325 MG/1
650 TABLET ORAL
Status: DISCONTINUED | OUTPATIENT
Start: 2019-05-04 | End: 2019-05-06 | Stop reason: HOSPADM

## 2019-05-04 RX ORDER — SODIUM CHLORIDE, SODIUM LACTATE, POTASSIUM CHLORIDE, CALCIUM CHLORIDE 600; 310; 30; 20 MG/100ML; MG/100ML; MG/100ML; MG/100ML
125 INJECTION, SOLUTION INTRAVENOUS CONTINUOUS
Status: DISPENSED | OUTPATIENT
Start: 2019-05-04 | End: 2019-05-05

## 2019-05-04 RX ORDER — FACIAL-BODY WIPES
10 EACH TOPICAL
Status: DISCONTINUED | OUTPATIENT
Start: 2019-05-04 | End: 2019-05-06 | Stop reason: HOSPADM

## 2019-05-04 RX ORDER — LORATADINE 10 MG/1
10 TABLET ORAL DAILY PRN
Status: DISCONTINUED | OUTPATIENT
Start: 2019-05-04 | End: 2019-05-06 | Stop reason: HOSPADM

## 2019-05-04 RX ORDER — NALOXONE HYDROCHLORIDE 0.4 MG/ML
0.2 INJECTION, SOLUTION INTRAMUSCULAR; INTRAVENOUS; SUBCUTANEOUS
Status: ACTIVE | OUTPATIENT
Start: 2019-05-04 | End: 2019-05-05

## 2019-05-04 RX ORDER — OXYTOCIN/RINGER'S LACTATE 20/1000 ML
125 PLASTIC BAG, INJECTION (ML) INTRAVENOUS CONTINUOUS
Status: DISCONTINUED | OUTPATIENT
Start: 2019-05-04 | End: 2019-05-06 | Stop reason: HOSPADM

## 2019-05-04 RX ORDER — OXYCODONE AND ACETAMINOPHEN 5; 325 MG/1; MG/1
1-2 TABLET ORAL
Status: DISCONTINUED | OUTPATIENT
Start: 2019-05-04 | End: 2019-05-06 | Stop reason: HOSPADM

## 2019-05-04 RX ORDER — ONDANSETRON 2 MG/ML
4 INJECTION INTRAMUSCULAR; INTRAVENOUS
Status: DISCONTINUED | OUTPATIENT
Start: 2019-05-04 | End: 2019-05-06 | Stop reason: HOSPADM

## 2019-05-04 RX ORDER — DIPHENHYDRAMINE HYDROCHLORIDE 50 MG/ML
25 INJECTION, SOLUTION INTRAMUSCULAR; INTRAVENOUS
Status: DISCONTINUED | OUTPATIENT
Start: 2019-05-04 | End: 2019-05-06 | Stop reason: HOSPADM

## 2019-05-04 RX ORDER — KETOROLAC TROMETHAMINE 30 MG/ML
30 INJECTION, SOLUTION INTRAMUSCULAR; INTRAVENOUS
Status: ACTIVE | OUTPATIENT
Start: 2019-05-04 | End: 2019-05-04

## 2019-05-04 RX ORDER — PROMETHAZINE HYDROCHLORIDE 25 MG/ML
25 INJECTION, SOLUTION INTRAMUSCULAR; INTRAVENOUS
Status: DISCONTINUED | OUTPATIENT
Start: 2019-05-04 | End: 2019-05-06 | Stop reason: HOSPADM

## 2019-05-04 RX ORDER — KETOROLAC TROMETHAMINE 30 MG/ML
30 INJECTION, SOLUTION INTRAMUSCULAR; INTRAVENOUS EVERY 6 HOURS
Status: DISPENSED | OUTPATIENT
Start: 2019-05-04 | End: 2019-05-05

## 2019-05-04 RX ORDER — SODIUM CHLORIDE 0.9 % (FLUSH) 0.9 %
5-40 SYRINGE (ML) INJECTION EVERY 8 HOURS
Status: DISCONTINUED | OUTPATIENT
Start: 2019-05-04 | End: 2019-05-06

## 2019-05-04 RX ORDER — IBUPROFEN 400 MG/1
800 TABLET ORAL
Status: DISCONTINUED | OUTPATIENT
Start: 2019-05-07 | End: 2019-05-05

## 2019-05-04 RX ORDER — SODIUM CHLORIDE 0.9 % (FLUSH) 0.9 %
5-40 SYRINGE (ML) INJECTION AS NEEDED
Status: DISCONTINUED | OUTPATIENT
Start: 2019-05-04 | End: 2019-05-06 | Stop reason: HOSPADM

## 2019-05-04 RX ADMIN — LABETALOL HCL 200 MG: 200 TABLET, FILM COATED ORAL at 08:29

## 2019-05-04 RX ADMIN — KETOROLAC TROMETHAMINE 30 MG: 30 INJECTION, SOLUTION INTRAMUSCULAR at 14:36

## 2019-05-04 RX ADMIN — LEVOTHYROXINE SODIUM 175 MCG: 25 TABLET ORAL at 07:21

## 2019-05-04 RX ADMIN — LABETALOL HCL 200 MG: 200 TABLET, FILM COATED ORAL at 20:30

## 2019-05-04 RX ADMIN — FERROUS SULFATE TAB 325 MG (65 MG ELEMENTAL FE) 325 MG: 325 (65 FE) TAB at 20:30

## 2019-05-04 RX ADMIN — KETOROLAC TROMETHAMINE 30 MG: 30 INJECTION, SOLUTION INTRAMUSCULAR at 08:29

## 2019-05-04 RX ADMIN — SODIUM CHLORIDE, SODIUM LACTATE, POTASSIUM CHLORIDE, AND CALCIUM CHLORIDE 75 ML/HR: 600; 310; 30; 20 INJECTION, SOLUTION INTRAVENOUS at 12:34

## 2019-05-04 RX ADMIN — MAGNESIUM SULFATE HEPTAHYDRATE 1 G/HR: 40 INJECTION, SOLUTION INTRAVENOUS at 14:37

## 2019-05-04 RX ADMIN — KETOROLAC TROMETHAMINE 30 MG: 30 INJECTION, SOLUTION INTRAMUSCULAR at 02:34

## 2019-05-04 NOTE — ANESTHESIA POSTPROCEDURE EVALUATION
POD #1 CS Intrathecal Narcotics Doing well Pain rated 0/10 No complications - no headache and full return neuromuscular function - although remains on bedrest while on magnesium.

## 2019-05-04 NOTE — PROGRESS NOTES
Labor Progress Note Patient seen, fetal heart rate and contraction pattern evaluated, patient examined. Patient Vitals for the past 1 hrs: 
 BP Pulse 19 1931 135/75 83 Physical Exam: 
Cervical Exam:  4 cm dilated 80% effaced   
-3 station Membranes:  leaking clear fluid Uterine Activity: Frequency: Every 10-12 minutes, Duration: 60 seconds and Intensity: moderate Fetal Heart Rate: Baseline: 125 per minute Variability: minimal 
Accelerations: no 
Decelerations: variable and late Assessment/Plan: 
 
Cat 2 FHR tracing with extended period of time with minimal variability and late decelerations despite multiple interventions. Not progressing without pitocin and Fetal tracing non reassuring with recurrent lates on low dose pitocin, Labor not progressing normally. Consult with Dr. Marietta Bautista. Decisiont o Proceed with  Section Nonreassuring fetal status with labor not progressing normally, findings consistent with failure of dilatation. Recommended proceeding with  delivery. Risks of bleeding, infection, bladder and bowel damage explained to patient and . They understand the situation and consent to the  delivery. Suzette Yap CNM

## 2019-05-04 NOTE — OP NOTES
Operative Note     Surgeon(s): Manpreet Epps MD  Assistant:  Babette Habermann  Pre-operative Diagnosis:  Non-reassuring fetal heart rate tracing, 34 weeks gestation, preeclampsia with severe features, morbid obesity  Post-operative Diagnosis: same as preop diagnosis, uterine atony  Procedure(s) Performed: Primary Low Transverse  Section                  Anesthesia:  Spinal  Findings: viable male infant, Apgar 1/7, normal uterus, ovaries, tubes  Complications: none   Estimated Blood Loss: 400 ml  Specimens:  Placenta  Implants: none  Procedure:   Patient was taken to the OR after informed consent had been obtained. Spinal anesthesia was then placed. She was then placed in a dorsal supine position with a left lateral tilt. The pannus was then taped for better exposure. She was prepped and draped in the normal sterile fashion. Time out was completed. Attention was turned to the abdomen and an Allis test confirmed adequate anesthesia. A Pfannenstiel skin incision was made 3 cm above the symphysis pubis. The incision was carried down to the fascia. The fascia was scored in the midline. The subcutaneous tissue and fascia were extended bilaterally. The rectus muscle was divided sharply. The peritoneum was identified, elevated and entered sharply. The incision was extended with good visualization of the bladder. The Fabrizio retractor was inserted. The uterus was scored in the lower uterine segment and then entered in the midline. The uterine incision was extended bilaterally, transversly. The fetal head was flexed, pressure was applied to the abdomen and the fetal head was delivered followed by the body. The cord was clamped and cut after a one minute delay. The placenta was manually extracted. The uterus was cleared of all clot and debris. Uterine atony was noted. An additional 10 units of pitocin and tranexamic acid 1 gram were added to the IV. Uterine massage was performed.   The incision was closed with 1 Monocryl in a running fashion. A second imbricating layer of 1 Monocryl was placed. The peritoneum was closed with 3-0 vicryl suture in a running fashion. The muscle was also closed with 3-0 vicryl interrupted sutures. Once excellent hemostasis was noted, the fascia was closed in a running fashion. The subcutaneous tissue was re-approximated with 2-0 plain suture. The skin was closed with 3-0 vicryl dissolvable sutures in a subcuticular fashion. Exofin was also applied to the incision. All counts were correct. The mother and child tolerated the procedure well.      Errol Pulido MD

## 2019-05-04 NOTE — ROUTINE PROCESS
1900 Bedside and Verbal shift change report given to LOC Kelly (oncoming nurse) by Hope Oliveira. Joe Monique RN (offgoing nurse). Report included the following information SBAR, Kardex, Procedure Summary, Intake/Output, MAR, Accordion, Recent Results, Med Rec Status and Quality Measures.

## 2019-05-04 NOTE — PROGRESS NOTES
Pt seen and evaluated. 34 2/7 wk  with fetal intolerance of labor. Induction of labor due to preeclampsia with severe fevers. Agree with CMN assessment. FHR tracing reviewed and discussed with the patient and her family. Will proceed with  section. Procedure discussed in detail. All questions answered.  
 
Redd Cao MD

## 2019-05-04 NOTE — ANESTHESIA PREPROCEDURE EVALUATION
Relevant Problems No relevant active problems Anesthetic History No history of anesthetic complications Review of Systems / Medical History Patient summary reviewed, nursing notes reviewed and pertinent labs reviewed Pulmonary Asthma Neuro/Psych Within defined limits Cardiovascular Exercise tolerance: >4 METS 
  
GI/Hepatic/Renal 
Within defined limits Endo/Other Hypothyroidism Morbid obesity Other Findings Physical Exam 
 
Airway Mallampati: III 
TM Distance: 4 - 6 cm Neck ROM: normal range of motion Mouth opening: Normal 
 
 Cardiovascular Regular rate and rhythm,  S1 and S2 normal,  no murmur, click, rub, or gallop Dental 
No notable dental hx Pulmonary Breath sounds clear to auscultation Abdominal 
GI exam deferred Other Findings Anesthetic Plan ASA: 3, emergent Anesthesia type: spinal 
 
 
Post-op pain plan if not by surgeon: intrathecal opiates Anesthetic plan and risks discussed with: Patient

## 2019-05-04 NOTE — ANESTHESIA POSTPROCEDURE EVALUATION
Post-Anesthesia Evaluation and Assessment Cardiovascular Function/Vital Signs Visit Vitals /72 Pulse 73 Temp 36.7 °C (98 °F) Resp 20 Ht 5' 3\" (1.6 m) Wt 127 kg (280 lb) SpO2 100% Breastfeeding? No  
BMI 49.60 kg/m² Patient is status post Procedure(s):  SECTION. Nausea/Vomiting: Controlled. Postoperative hydration reviewed and adequate. Pain: 
Pain Scale 1: Numeric (0 - 10) (19 0730) Pain Intensity 1: 5 (19 0730) Managed. Neurological Status:  
Neuro (WDL): Within Defined Limits (19 1800) At baseline. Mental Status and Level of Consciousness: Arousable. Pulmonary Status:  
O2 Device: Room air (19 6796) Adequate oxygenation and airway patent. Complications related to anesthesia: None Post-anesthesia assessment completed. No concerns. Patient has met all discharge requirements. Signed By: Sage Chand CRNA May 3, 2019

## 2019-05-04 NOTE — PROGRESS NOTES
1900 Bedside and Verbal shift change report given to LOC Erickson Chi (oncoming nurse) by Jerrell Delgado. Mike Chaparro RN (offgoing nurse). Report included the following information SBAR, Kardex, Procedure Summary, Intake/Output, MAR, Accordion, Recent Results, Med Rec Status and Quality Measures. 1930 Pt turned to Rt lat side 1945 Pt in semi fowlers 2005 Pit turned off per CN 
 
2022 c/s called for fetal intolerance of labor 2200 delivery of viable infant boy by c/s. Apgars 1/7 
 
2252 Pt back in Room 7 
 
0700 Bedside and Verbal shift change report given to LOC Morgan RN (oncoming nurse) by Noel Rodriguez RN (offgoing nurse). Report included the following information SBAR, Kardex, Procedure Summary, Intake/Output, MAR, Accordion, Recent Results, Med Rec Status and Quality Measures.

## 2019-05-04 NOTE — PROGRESS NOTES
Progress Note Patient: Shubham Sun MRN: [id number]  SSN: [id number] YOB: 1985  Age: 34 y.o. Sex: female Subjective:  
 
Postpartum Day: 1 Delivery: Primary Low transverse  delivery The patient feels well. The patient denies HA, vision changes, epigastric pain, SOB or chest pain. Pain is  well controlled with current medications. The baby is in NICU . Mom plans to breastfeed. Stratton is intact and urinary output is adequate. The patient is tolerating a normal diet. Flatus has been passed. Objective:  
  
Patient Vitals for the past 8 hrs: 
 BP Temp Pulse Resp  
19 1030 123/72  64   
19 1000 129/75  69   
19 0930 143/79  66   
19 0900 145/89  73   
19 0830 146/84  69   
19 0800 144/85 98.1 °F (36.7 °C) 68 16  
19 0730 142/83  65   
19 0700 135/72  80   
19 0600 128/69  61 18  
19 0530 131/69  62  General:    alert, cooperative, no distress Bowel Sounds:  active Lochia:  appropriate Uterine Fundus:   firm Fundus Location:  0 Incision:  no significant drainage, no dehiscence, no significant erythema DVT Evaluation:  No evidence of DVT seen on physical exam. 
Negative Shrilene's sign. No cords or calf tenderness. Calf/Ankle edema is present. Reflexes 1+ to lower extremities Lab/Data Review: 
Lab results reviewed. For significant abnormal values and values requiring intervention, see assessment and plan. Assessment:  
Delivery: Primary LTCS Status post: Doing well postpartum  delivery Plan:  
Mom is stable denies pre-eclampsia s/sx. Continue to monitor blood pressures and for s/sx of pre-eclampsia. Discontinue Magnesium Sulfate IV after 24 hours post delivery. DC stratton once MgSo4 discontinued. SCD's while in bed. Transition to po pain medications. Plan to transfer to postpartum tomorrow after MgSo4 DC and blood pressures are stable.

## 2019-05-04 NOTE — PROGRESS NOTES
0710 Bedside and verbal report received from Inna Streeter RN. Assumed care of patient at this time. 1915 Bedside and verbal report given to DOROTA Ibanez RN. Assumed care of patient at this time.

## 2019-05-05 PROBLEM — D64.9 ANEMIA: Status: ACTIVE | Noted: 2019-05-05

## 2019-05-05 PROBLEM — O09.90 AT HIGH RISK FOR COMPLICATIONS OF INTRAUTERINE PREGNANCY (IUP): Status: RESOLVED | Noted: 2019-04-30 | Resolved: 2019-05-05

## 2019-05-05 PROBLEM — O16.3 ELEVATED BLOOD PRESSURE AFFECTING PREGNANCY IN THIRD TRIMESTER, ANTEPARTUM: Status: RESOLVED | Noted: 2019-04-30 | Resolved: 2019-05-05

## 2019-05-05 PROCEDURE — 74011250637 HC RX REV CODE- 250/637: Performed by: MIDWIFE

## 2019-05-05 PROCEDURE — 65270000029 HC RM PRIVATE

## 2019-05-05 PROCEDURE — 74011250636 HC RX REV CODE- 250/636: Performed by: MIDWIFE

## 2019-05-05 PROCEDURE — 74011250636 HC RX REV CODE- 250/636: Performed by: OBSTETRICS & GYNECOLOGY

## 2019-05-05 PROCEDURE — 74011250637 HC RX REV CODE- 250/637: Performed by: ADVANCED PRACTICE MIDWIFE

## 2019-05-05 RX ORDER — IBUPROFEN 800 MG/1
800 TABLET ORAL
Qty: 30 TAB | Refills: 0 | Status: SHIPPED | OUTPATIENT
Start: 2019-05-07

## 2019-05-05 RX ORDER — LABETALOL 200 MG/1
200 TABLET, FILM COATED ORAL EVERY 12 HOURS
Qty: 60 TAB | Refills: 2 | Status: SHIPPED | OUTPATIENT
Start: 2019-05-05 | End: 2019-08-03

## 2019-05-05 RX ORDER — DOCUSATE SODIUM 100 MG/1
100 CAPSULE, LIQUID FILLED ORAL
Qty: 60 CAP | Refills: 2 | Status: SHIPPED | OUTPATIENT
Start: 2019-05-05 | End: 2019-08-03

## 2019-05-05 RX ORDER — IBUPROFEN 400 MG/1
800 TABLET ORAL
Status: DISCONTINUED | OUTPATIENT
Start: 2019-05-06 | End: 2019-05-06 | Stop reason: HOSPADM

## 2019-05-05 RX ORDER — OXYCODONE AND ACETAMINOPHEN 5; 325 MG/1; MG/1
1-2 TABLET ORAL
Qty: 20 TAB | Refills: 0 | Status: SHIPPED | OUTPATIENT
Start: 2019-05-05 | End: 2019-05-08

## 2019-05-05 RX ORDER — LANOLIN ALCOHOL/MO/W.PET/CERES
325 CREAM (GRAM) TOPICAL
Qty: 60 TAB | Refills: 2 | Status: SHIPPED | OUTPATIENT
Start: 2019-05-05 | End: 2019-08-03

## 2019-05-05 RX ADMIN — KETOROLAC TROMETHAMINE 30 MG: 30 INJECTION, SOLUTION INTRAMUSCULAR at 06:46

## 2019-05-05 RX ADMIN — LABETALOL HCL 200 MG: 200 TABLET, FILM COATED ORAL at 20:39

## 2019-05-05 RX ADMIN — LABETALOL HCL 200 MG: 200 TABLET, FILM COATED ORAL at 08:44

## 2019-05-05 RX ADMIN — FERROUS SULFATE TAB 325 MG (65 MG ELEMENTAL FE) 325 MG: 325 (65 FE) TAB at 08:44

## 2019-05-05 RX ADMIN — KETOROLAC TROMETHAMINE 30 MG: 30 INJECTION, SOLUTION INTRAMUSCULAR at 01:13

## 2019-05-05 RX ADMIN — LEVOTHYROXINE SODIUM 175 MCG: 25 TABLET ORAL at 06:44

## 2019-05-05 RX ADMIN — KETOROLAC TROMETHAMINE 30 MG: 30 INJECTION, SOLUTION INTRAMUSCULAR at 18:25

## 2019-05-05 RX ADMIN — KETOROLAC TROMETHAMINE 30 MG: 30 INJECTION, SOLUTION INTRAMUSCULAR at 12:17

## 2019-05-05 RX ADMIN — FAMOTIDINE 20 MG: 10 INJECTION, SOLUTION INTRAVENOUS at 22:12

## 2019-05-05 RX ADMIN — FERROUS SULFATE TAB 325 MG (65 MG ELEMENTAL FE) 325 MG: 325 (65 FE) TAB at 18:25

## 2019-05-05 RX ADMIN — OXYCODONE HYDROCHLORIDE AND ACETAMINOPHEN 1 TABLET: 5; 325 TABLET ORAL at 13:32

## 2019-05-05 NOTE — PROGRESS NOTES
1915 Bedside report received from LOC Suh. 1930 Assessment complete. VSS. Denies needs. 2030 Set mom up with double electric breast pump and educated on how to use initiation mode, pump hygiene, and safe milk storage due to infant in NICU. Mom to pump q 3 hours for 15 minutes on initiation mode. Mom verbalized understanding and no questions at this time. 2300 Ambulated up to BR and voided a large amount. Assisted with pericare. Pads changed. Tolerated well w/o any dizziness or weakness. Told not to get out of bed by herself. Call bell within reach.

## 2019-05-05 NOTE — ROUTINE PROCESS
TRANSFER - IN REPORT: 
 
Verbal report [facility]aley [facility]  [facility] received from L&D(unit) for routine progression of care Report consisted of patients Situation, Background, Assessment and  
Recommendations(SBAR). Information from the following report(s) SBAR, Kardex, Intake/Output and MAR was reviewed with the receiving nurse. Opportunity for questions and clarification was provided. Assessment completed upon patients arrival to unit and care assumed. 0700-Bedside and Verbal shift change report given to DOROTA Nolasco LPN (oncoming nurse) by Clay Pitts RN (offgoing nurse). Report included the following information SBAR, Kardex and MAR.

## 2019-05-05 NOTE — PROGRESS NOTES
Problem: Falls - Risk of 
Goal: *Absence of Falls Description Document Thien Espinoza Fall Risk and appropriate interventions in the flowsheet. Outcome: Progressing Towards Goal 
  
Problem: Pain Goal: *Control of Pain Outcome: Progressing Towards Goal 
  
Problem: Vaginal Delivery: Day of Deliver-Laboring Goal: Medications Outcome: Progressing Towards Goal 
Goal: *Labs within defined limits Outcome: Progressing Towards Goal

## 2019-05-05 NOTE — PROGRESS NOTES
Bedside and Verbal shift change report given to MARIZA Atkinson RN (oncoming nurse) by Jerzy Balderas RN (offgoing nurse). Report given with TARAS, Yenyn, MAR and Recent Results.

## 2019-05-05 NOTE — DISCHARGE SUMMARY
Discharge Summary    Admit date: 2019  Discharge date:  2019    Postoperative Diagnosis: Non-reassuring electronic fetal monitoring tracing [O76]    Procedure: Low Cervical Transverse Procedure(s):   SECTION    Name: Flower Berry   Medical Record Number: [id number]   YOB: 1985    Significant admission findings ( see H&P):   Hospital course: Patient was admitted, elective  section was performed on the day of admission. Baby weight and Apgars are shown below. Procedure was uneventful, with no obvious Complications. Postop course was uneventful, with no significant fever, and vitals normal, except as mentioned below. She mobilized well, didbreast-feed. She was discharged home on the third day post partum. She was asked to see us in the office in 6 weeks. She had good support at home, and will call if she has any problems before she sees us in the office. Incision looked good prior to discharge with no sign of hematoma, swelling, or infection. Findings:     Anesthesia: Spinal    Birth Information:   Information for the patient's :  Sean Do [[id number]]          Pelvic findings at surgery: normal    Estimated Blood Loss: refer to surgical note.     Relevant Lab:   Recent Labs     19  0808 19  1245   WBC  --  11.4   HGB 8.8* 9.2*   HCT 28.6* 30.1*   PLT  --  280       Lab Results   Component Value Date/Time    Rubella, External Immune 2018       Signed: Luh Calixto CNM      May 5, 2019

## 2019-05-05 NOTE — PROGRESS NOTES
Progress Note Patient: Flower Berry MRN: [id number] YOB: 1985  Age: 34 y.o. Subjective:  
 
Post-Operative Day: 2 The patient is feeling well. Pain is  well controlled with current medications. Baby is feeding via both breast and bottle  without difficulty. Urinary output is adequate. The patient is ambulating well and is tolerating a regular diet. Pt is passing flatus and reports BM X1. Denies HA, visual changes or epigastric pain. Objective:  
  
Patient Vitals for the past 8 hrs: 
 BP Temp Pulse Resp SpO2  
19 0815 128/68 98.6 °F (37 °C) 90 20 100 % General:    alert, cooperative, no distress Bowel Sounds:  active all quadrants Lochia:  appropriate and small Uterine Fundus:   firm @ umbilicus Incision:  Well approximated with surgical glue. Dry & Intact DVT Evaluation:  No evidence of DVT seen on physical exam. 
No cords or calf tenderness. Calf/Ankle edema is present- trace. Lab/Data Review: 
Recent Results (from the past 24 hour(s)) MAGNESIUM Collection Time: 19  1:37 PM  
Result Value Ref Range Magnesium 6.6 (HH) 1.6 - 2.6 mg/dL MAGNESIUM Collection Time: 19  8:05 PM  
Result Value Ref Range Magnesium 5.9 (HH) 1.6 - 2.6 mg/dL Lab results reviewed. For significant abnormal values and values requiring intervention, see assessment and plan. Assessment: POD #2 Delivery: Primary LTCS Status post: Doing well postpartum  delivery Plan:  
 
Doing well postpartum  delivery. H/H 8.8/28. 6. Denies signs of anemia. Will send home on ferrous sulfate 325 mg BID. Plan for DC home tomorrow if remains stable overnight. Follow-up in office in 6 weeks, call prn. Current Discharge Medication List  
  
START taking these medications Details  
ferrous sulfate 325 mg (65 mg iron) tablet Take 1 Tab by mouth Before breakfast and dinner for 90 days. Indications: anemia from inadequate iron Qty: 60 Tab, Refills: 2  
  
ibuprofen (MOTRIN) 800 mg tablet Take 1 Tab by mouth every eight (8) hours as needed for Pain. Qty: 30 Tab, Refills: 0  
  
labetalol (NORMODYNE) 200 mg tablet Take 1 Tab by mouth every twelve (12) hours for 90 days. Qty: 60 Tab, Refills: 2  
  
oxyCODONE-acetaminophen (PERCOCET) 5-325 mg per tablet Take 1-2 Tabs by mouth every four (4) hours as needed for Pain for up to 3 days. Max Daily Amount: 12 Tabs. Qty: 20 Tab, Refills: 0 Associated Diagnoses: Postpartum care following  delivery  
  
docusate sodium (COLACE) 100 mg capsule Take 1 Cap by mouth two (2) times daily as needed for Constipation for up to 90 days. Qty: 60 Cap, Refills: 2 CONTINUE these medications which have NOT CHANGED Details  
prenatal vit calc,iron,folic (PRENATAL VITAMIN PO) Take  by mouth. cholecalciferol, vitamin D3, (VITAMIN D3) 2,000 unit tab Take  by mouth.  
  
levothyroxine (SYNTHROID) 175 mcg tablet Take 175 mcg by mouth Daily (before breakfast). Signed By: Lou Hein CNM May 5, 2019

## 2019-05-05 NOTE — PROGRESS NOTES
Bedside and Verbal shift change report given to K. Charity Gaucher (oncoming nurse) by Aniket Servin RN (offgoing nurse). Report included the following information SBAR, Kardex, Intake/Output, MAR and Recent Results. Patient resting in bed, FOB at bedside. No needs expressed. Instructed to call if needed. 2035- Assessment completed. VSS. Requests medication for GERD. Will administer once available. 2145- Ambulated in [place] with FOB to NICU. 
 
0700- Bedside and Verbal shift change report given to VAISHALI Lo RN (oncoming nurse) by Kiera Hamilton. Tono August RN (offgoing nurse). Report included the following information SBAR, Kardex, Intake/Output, MAR and Recent Results.

## 2019-05-05 NOTE — DISCHARGE INSTRUCTIONS
POST DELIVERY DISCHARGE INSTRUCTIONS    Name: Ismael Holden  YOB: 1985  Primary Diagnosis: Active Problems: Morbid obesity ([address] (2019)      Hypothyroidism (2019)      Asthma (2019)      Postpartum care following  delivery (2019)      Anemia (2019)        General:     Diet/Diet Restrictions:  Eight 8-ounce glasses of fluid daily (water, juices); avoid excessive caffeine intake. Meals/snacks as desired which are high in fiber and carbohydrates and low in fat and cholesterol. Physical Activity / Restrictions / Safety:     Avoid heavy lifting, no more than the baby alone (not the baby in the car seat). For 2-3 weeks; limit use of stairs to 2 times daily for the first week home. No driving for two weeks. Avoid intercourse until you complete your postpartum check. No douching or tampon use. Check with obstetrician before starting or resuming an exercise program.    Call your doctor for the following:     Fever over 101 degrees by mouth. Vaginal bleeding that soaks more than 2 pads in an hour for more than one hour or if the discharge starts to smell bad. Red streaks or increased swelling of legs, painful red streaks on your breast.  Foul discharge from your incision or the appearance or tender, red areas around it. If you feel extremely anxious or overwhelmed. If you have thoughts of harming yourself and/or your baby. Pain Management:     Pain Management:   Take Ibuprofen (Advil, Motrin), as directed for pain and use prescription narcotic pain medication as needed. Heating pad to  incision as needed. Follow-Up Care:     Appointment with MD:   Follow-up Appointments   Procedures    FOLLOW UP VISIT Appointment in: 6 Weeks 1. Follow up in 6 weeks with Dr. Marbin Knight for your postpartum visit. 2. Follow up with [facility] in 1 week for hypothyroid and blood pressure management. 1. Follow up in 6 weeks with Dr. Marbin Knight for your postpartum visit.   2. Follow up with [facility] in 1 week for hypothyroid and blood pressure management.      Standing Status:   Standing     Number of Occurrences:   1     Order Specific Question:   Appointment in     Answer:   6 Weeks     Telephone number: [phone]      Signed By: Micki Marsh CNM

## 2019-05-05 NOTE — PROGRESS NOTES
Assumed care of pt. 
0844-assessment completed. Scheduled  meds given. Family members in room. Denies needs. 1000-resting in bed. Encouraged ambulation. Denies needs. 1105-ambulated to NICU to see infant. 1220-VSS. Scheduled toradol given by Darlene Ackerman RN. Denies any other needs. 1415-ambulating in hallway. Denies needs. 1440-Bedside and Verbal shift change report given to YVON Villar RN  (oncoming nurse) by DOROTA Nolasco LPN (offgoing nurse). Report given with SBAR, Kardex, Intake/Output, MAR and Recent Results.

## 2019-05-06 VITALS
WEIGHT: 280 LBS | RESPIRATION RATE: 18 BRPM | HEART RATE: 72 BPM | HEIGHT: 63 IN | OXYGEN SATURATION: 100 % | DIASTOLIC BLOOD PRESSURE: 78 MMHG | TEMPERATURE: 98.4 F | SYSTOLIC BLOOD PRESSURE: 148 MMHG | BODY MASS INDEX: 49.61 KG/M2

## 2019-05-06 PROCEDURE — 74011250637 HC RX REV CODE- 250/637: Performed by: MIDWIFE

## 2019-05-06 PROCEDURE — 74011250637 HC RX REV CODE- 250/637: Performed by: ADVANCED PRACTICE MIDWIFE

## 2019-05-06 RX ADMIN — ACETAMINOPHEN 1000 MG: 500 TABLET, FILM COATED ORAL at 07:36

## 2019-05-06 RX ADMIN — LABETALOL HCL 200 MG: 200 TABLET, FILM COATED ORAL at 11:35

## 2019-05-06 RX ADMIN — FERROUS SULFATE TAB 325 MG (65 MG ELEMENTAL FE) 325 MG: 325 (65 FE) TAB at 07:36

## 2019-05-06 RX ADMIN — LEVOTHYROXINE SODIUM 175 MCG: 25 TABLET ORAL at 05:51

## 2019-05-06 NOTE — PROGRESS NOTES
0710 Bedside and Verbal shift change report given to VAISHALI Hudson RN (oncoming nurse) by Jerrell Lutz RN (offgoing nurse). Report included the following information SBAR, Kardex, Intake/Output, MAR and Recent Results. 1135 patient in room with visitors yelling, sitting on edge of chair, just returned from NICU. Visitors asked to leave, patient assisted to lie down, lights dimmed. 1210 BP re-check, 148/78. CNM notified, verbal orders for patient to return to office on Thursday for a blood pressure check 1545 Patient discharged via wheelchair per protocol. Patient in stable condition. Discharged education reviewed and packet given to patient. Patient verbalizes understanding of discharge instructions. E-sign completed. Armbands removed and given to patient. No further needs or questions reported at this time.

## 2019-05-06 NOTE — LACTATION NOTE
Discussed pumping q 3 hours. Breastfeeding discharge teaching completed to include feeding on demand, foremilk and hindmilk importance, engorgement, mastitis, clogged ducts, pumping, breastmilk storage, and returning to work. Information given about unit and office phone numbers and encouraged mom to reach out if concerns arise, but that [facility] would be calling her in the next few days to follow up on breastfeeding. Mom verbalized understanding and no questions at this time.

## 2023-01-04 PROCEDURE — 96372 THER/PROPH/DIAG INJ SC/IM: CPT

## 2023-01-04 PROCEDURE — 99284 EMERGENCY DEPT VISIT MOD MDM: CPT

## 2023-01-05 ENCOUNTER — HOSPITAL ENCOUNTER (EMERGENCY)
Age: 38
Discharge: HOME | End: 2023-01-05
Attending: EMERGENCY MEDICINE
Payer: MEDICAID

## 2023-01-05 ENCOUNTER — APPOINTMENT (OUTPATIENT)
Dept: ULTRASOUND IMAGING | Age: 38
End: 2023-01-05
Attending: EMERGENCY MEDICINE
Payer: MEDICAID

## 2023-01-05 VITALS
HEART RATE: 88 BPM | BODY MASS INDEX: 54.04 KG/M2 | RESPIRATION RATE: 16 BRPM | DIASTOLIC BLOOD PRESSURE: 92 MMHG | WEIGHT: 268.08 LBS | SYSTOLIC BLOOD PRESSURE: 137 MMHG | HEIGHT: 59 IN | OXYGEN SATURATION: 99 % | TEMPERATURE: 98.5 F

## 2023-01-05 DIAGNOSIS — N93.9 VAGINAL BLEEDING: ICD-10-CM

## 2023-01-05 DIAGNOSIS — Z3A.01 LESS THAN 8 WEEKS GESTATION OF PREGNANCY: ICD-10-CM

## 2023-01-05 DIAGNOSIS — O36.80X0 PREGNANCY OF UNKNOWN ANATOMIC LOCATION: ICD-10-CM

## 2023-01-05 DIAGNOSIS — N39.0 ACUTE UTI: Primary | ICD-10-CM

## 2023-01-05 LAB
ALBUMIN SERPL-MCNC: 3.1 G/DL (ref 3.4–5)
ALBUMIN/GLOB SERPL: 0.8 {RATIO} (ref 0.8–1.7)
ALP SERPL-CCNC: 95 U/L (ref 45–117)
ALT SERPL-CCNC: 18 U/L (ref 13–56)
ANION GAP SERPL CALC-SCNC: 4 MMOL/L (ref 3–18)
APPEARANCE UR: ABNORMAL
APTT PPP: 26.4 SEC (ref 23–36.4)
AST SERPL-CCNC: 10 U/L (ref 10–38)
BACTERIA URNS QL MICRO: NEGATIVE /HPF
BASOPHILS # BLD: 0 K/UL (ref 0–0.1)
BASOPHILS NFR BLD: 0 % (ref 0–2)
BILIRUB SERPL-MCNC: 0.2 MG/DL (ref 0.2–1)
BILIRUB UR QL: NEGATIVE
BUN SERPL-MCNC: 9 MG/DL (ref 7–18)
BUN/CREAT SERPL: 12 (ref 12–20)
CALCIUM SERPL-MCNC: 9 MG/DL (ref 8.5–10.1)
CHLORIDE SERPL-SCNC: 104 MMOL/L (ref 100–111)
CO2 SERPL-SCNC: 27 MMOL/L (ref 21–32)
COLOR UR: ABNORMAL
CREAT SERPL-MCNC: 0.73 MG/DL (ref 0.6–1.3)
DIFFERENTIAL METHOD BLD: ABNORMAL
EOSINOPHIL # BLD: 0.3 K/UL (ref 0–0.4)
EOSINOPHIL NFR BLD: 3 % (ref 0–5)
EPITH CASTS URNS QL MICRO: NORMAL /LPF (ref 0–5)
ERYTHROCYTE [DISTWIDTH] IN BLOOD BY AUTOMATED COUNT: 14.9 % (ref 11.6–14.5)
GLOBULIN SER CALC-MCNC: 3.7 G/DL (ref 2–4)
GLUCOSE SERPL-MCNC: 141 MG/DL (ref 74–99)
GLUCOSE UR STRIP.AUTO-MCNC: NEGATIVE MG/DL
HCG SERPL-ACNC: ABNORMAL MIU/ML (ref 0–10)
HCT VFR BLD AUTO: 35.5 % (ref 35–45)
HGB BLD-MCNC: 11.1 G/DL (ref 12–16)
HGB UR QL STRIP: ABNORMAL
IMM GRANULOCYTES # BLD AUTO: 0 K/UL (ref 0–0.04)
IMM GRANULOCYTES NFR BLD AUTO: 0 % (ref 0–0.5)
INR PPP: 1 (ref 0.8–1.2)
KETONES UR QL STRIP.AUTO: ABNORMAL MG/DL
LEUKOCYTE ESTERASE UR QL STRIP.AUTO: ABNORMAL
LIPASE SERPL-CCNC: 168 U/L (ref 73–393)
LYMPHOCYTES # BLD: 1.9 K/UL (ref 0.9–3.6)
LYMPHOCYTES NFR BLD: 20 % (ref 21–52)
MAGNESIUM SERPL-MCNC: 2 MG/DL (ref 1.6–2.6)
MCH RBC QN AUTO: 22.9 PG (ref 24–34)
MCHC RBC AUTO-ENTMCNC: 31.3 G/DL (ref 31–37)
MCV RBC AUTO: 73.2 FL (ref 78–100)
MONOCYTES # BLD: 0.4 K/UL (ref 0.05–1.2)
MONOCYTES NFR BLD: 4 % (ref 3–10)
NEUTS SEG # BLD: 6.9 K/UL (ref 1.8–8)
NEUTS SEG NFR BLD: 72 % (ref 40–73)
NITRITE UR QL STRIP.AUTO: NEGATIVE
NRBC # BLD: 0 K/UL (ref 0–0.01)
NRBC BLD-RTO: 0 PER 100 WBC
PH UR STRIP: 5.5 [PH] (ref 5–8)
PLATELET # BLD AUTO: 372 K/UL (ref 135–420)
PMV BLD AUTO: 9.4 FL (ref 9.2–11.8)
POTASSIUM SERPL-SCNC: 4.2 MMOL/L (ref 3.5–5.5)
PROT SERPL-MCNC: 6.8 G/DL (ref 6.4–8.2)
PROT UR STRIP-MCNC: 100 MG/DL
PROTHROMBIN TIME: 13.8 SEC (ref 11.5–15.2)
RBC # BLD AUTO: 4.85 M/UL (ref 4.2–5.3)
RBC #/AREA URNS HPF: NORMAL /HPF (ref 0–5)
SODIUM SERPL-SCNC: 135 MMOL/L (ref 136–145)
SP GR UR REFRACTOMETRY: 1.02 (ref 1–1.03)
UROBILINOGEN UR QL STRIP.AUTO: 0.2 EU/DL (ref 0.2–1)
WBC # BLD AUTO: 9.6 K/UL (ref 4.6–13.2)
WBC URNS QL MICRO: NORMAL /HPF (ref 0–5)

## 2023-01-05 PROCEDURE — 83690 ASSAY OF LIPASE: CPT

## 2023-01-05 PROCEDURE — 74011250636 HC RX REV CODE- 250/636: Performed by: EMERGENCY MEDICINE

## 2023-01-05 PROCEDURE — 87086 URINE CULTURE/COLONY COUNT: CPT

## 2023-01-05 PROCEDURE — 85730 THROMBOPLASTIN TIME PARTIAL: CPT

## 2023-01-05 PROCEDURE — 80053 COMPREHEN METABOLIC PANEL: CPT

## 2023-01-05 PROCEDURE — 84702 CHORIONIC GONADOTROPIN TEST: CPT

## 2023-01-05 PROCEDURE — 85610 PROTHROMBIN TIME: CPT

## 2023-01-05 PROCEDURE — 81001 URINALYSIS AUTO W/SCOPE: CPT

## 2023-01-05 PROCEDURE — 76817 TRANSVAGINAL US OBSTETRIC: CPT

## 2023-01-05 PROCEDURE — 83735 ASSAY OF MAGNESIUM: CPT

## 2023-01-05 PROCEDURE — 85025 COMPLETE CBC W/AUTO DIFF WBC: CPT

## 2023-01-05 RX ORDER — CEPHALEXIN 500 MG/1
500 CAPSULE ORAL 4 TIMES DAILY
Qty: 28 CAPSULE | Refills: 0 | Status: SHIPPED | OUTPATIENT
Start: 2023-01-05 | End: 2023-01-12

## 2023-01-05 RX ORDER — METHOTREXATE 25 MG/ML
112.5 INJECTION, SOLUTION INTRA-ARTERIAL; INTRAMUSCULAR; INTRAVENOUS ONCE
Status: COMPLETED | OUTPATIENT
Start: 2023-01-05 | End: 2023-01-05

## 2023-01-05 RX ADMIN — METHOTREXATE 112.5 MG: 25 INJECTION, SOLUTION INTRA-ARTERIAL; INTRAMUSCULAR; INTRAVENOUS at 10:43

## 2023-01-05 NOTE — ED TRIAGE NOTES
Pt arrives to ed reporting abd pain and vaginal bleeding that began that began tonight. Pt states she is 8 weeks pregnant.

## 2023-01-05 NOTE — ED PROVIDER NOTES
EMERGENCY DEPARTMENT CONTINUING CARE NOTE    Date: 1/5/2023  Patient Name: Severiano Cruz    Diagnostic Study Results     Labs -     Recent Results (from the past 12 hour(s))   METABOLIC PANEL, COMPREHENSIVE    Collection Time: 01/05/23  2:07 AM   Result Value Ref Range    Sodium 135 (L) 136 - 145 mmol/L    Potassium 4.2 3.5 - 5.5 mmol/L    Chloride 104 100 - 111 mmol/L    CO2 27 21 - 32 mmol/L    Anion gap 4 3.0 - 18 mmol/L    Glucose 141 (H) 74 - 99 mg/dL    BUN 9 7.0 - 18 MG/DL    Creatinine 0.73 0.6 - 1.3 MG/DL    BUN/Creatinine ratio 12 12 - 20      eGFR >60 >60 ml/min/1.73m2    Calcium 9.0 8.5 - 10.1 MG/DL    Bilirubin, total 0.2 0.2 - 1.0 MG/DL    ALT (SGPT) 18 13 - 56 U/L    AST (SGOT) 10 10 - 38 U/L    Alk.  phosphatase 95 45 - 117 U/L    Protein, total 6.8 6.4 - 8.2 g/dL    Albumin 3.1 (L) 3.4 - 5.0 g/dL    Globulin 3.7 2.0 - 4.0 g/dL    A-G Ratio 0.8 0.8 - 1.7     LIPASE    Collection Time: 01/05/23  2:07 AM   Result Value Ref Range    Lipase 168 73 - 393 U/L   MAGNESIUM    Collection Time: 01/05/23  2:07 AM   Result Value Ref Range    Magnesium 2.0 1.6 - 2.6 mg/dL   URINALYSIS W/ RFLX MICROSCOPIC    Collection Time: 01/05/23  2:07 AM   Result Value Ref Range    Color RED      Appearance CLOUDY      Specific gravity 1.020 1.005 - 1.030      pH (UA) 5.5 5.0 - 8.0      Protein 100 (A) NEG mg/dL    Glucose Negative NEG mg/dL    Ketone TRACE (A) NEG mg/dL    Bilirubin Negative NEG      Blood LARGE (A) NEG      Urobilinogen 0.2 0.2 - 1.0 EU/dL    Nitrites Negative NEG      Leukocyte Esterase SMALL (A) NEG     BETA HCG, QT    Collection Time: 01/05/23  2:07 AM   Result Value Ref Range    Beta HCG, QT 17,080 (H) 0 - 10 MIU/ML   URINE MICROSCOPIC ONLY    Collection Time: 01/05/23  2:07 AM   Result Value Ref Range    WBC 4 to 10 0 - 5 /hpf    RBC TOO NUMEROUS TO COUNT 0 - 5 /hpf    Epithelial cells 1+ 0 - 5 /lpf    Bacteria Negative NEG /hpf   CBC WITH AUTOMATED DIFF    Collection Time: 01/05/23  2:23 AM   Result Value Ref Range    WBC 9.6 4.6 - 13.2 K/uL    RBC 4.85 4.20 - 5.30 M/uL    HGB 11.1 (L) 12.0 - 16.0 g/dL    HCT 35.5 35.0 - 45.0 %    MCV 73.2 (L) 78.0 - 100.0 FL    MCH 22.9 (L) 24.0 - 34.0 PG    MCHC 31.3 31.0 - 37.0 g/dL    RDW 14.9 (H) 11.6 - 14.5 %    PLATELET 943 833 - 930 K/uL    MPV 9.4 9.2 - 11.8 FL    NRBC 0.0 0  WBC    ABSOLUTE NRBC 0.00 0.00 - 0.01 K/uL    NEUTROPHILS 72 40 - 73 %    LYMPHOCYTES 20 (L) 21 - 52 %    MONOCYTES 4 3 - 10 %    EOSINOPHILS 3 0 - 5 %    BASOPHILS 0 0 - 2 %    IMMATURE GRANULOCYTES 0 0.0 - 0.5 %    ABS. NEUTROPHILS 6.9 1.8 - 8.0 K/UL    ABS. LYMPHOCYTES 1.9 0.9 - 3.6 K/UL    ABS. MONOCYTES 0.4 0.05 - 1.2 K/UL    ABS. EOSINOPHILS 0.3 0.0 - 0.4 K/UL    ABS. BASOPHILS 0.0 0.0 - 0.1 K/UL    ABS. IMM. GRANS. 0.0 0.00 - 0.04 K/UL    DF AUTOMATED     PROTHROMBIN TIME + INR    Collection Time: 01/05/23  2:23 AM   Result Value Ref Range    Prothrombin time 13.8 11.5 - 15.2 sec    INR 1.0 0.8 - 1.2     PTT    Collection Time: 01/05/23  2:23 AM   Result Value Ref Range    aPTT 26.4 23.0 - 36.4 SEC       Radiologic Studies -   US OB TV W DOPPLER   Final Result      1. No sonographically identified gestational sac, saclike structure IUP. Overall, pregnancy of unknown location. Recommend short interval follow-up   serial beta hCG value with repeat pelvic ultrasound as indicated. 2. Nonvisualized right ovary. CT Results  (Last 48 hours)      None          CXR Results  (Last 48 hours)      None            ED Course     TRANSFER OF CARE:  7:07 AM  Patient care will be transferred from AC Pitts to Aaron Esquivel MD.  Discussed available diagnostic results and care plan at length at beside. Patient and family made aware of provider change. All questions answered. Patient and family voiced understanding.     MEDICATIONS ADMINISTERED IN THE ED:  Medications   methotrexate chemo syringe 112.5 mg (has no administration in time range)       ED Course as of 01/05/23 0943   Thu 2023   6822 CONSULT NOTE:   Shantelle Burger MD spoke with Dr. Delaney Nguyen, she recommends methotrexate and follow-up with Dr. Peter Pedraza in 3 days. [JM]   1289 Discussed risks and benefits of MTX with Pt. She agrees to treatment. Patient states that she was passing a lot of blood and clots in the bathroom prior to getting back to her room. [JM]      ED Course User Index  [JM] Susi Connell MD     TRANSFER OF CARE:  7:41 AM   Patient care will be transferred from Shantelle Burger MD to Dr. Narda Sosa. Discussed available diagnostic results and care plan at length at beside. Patient and family made aware of provider change. All questions answered. Patient and family voiced understanding. Medical Decision Making     DDX: Pregnancy of unknown location, inevitable miscarriage, incomplete miscarriage, threatened miscarriage    DISCUSSION:  This appears to be a severe conditon. This appears to be an acute condition. 30 y.o. female -0-4-2 at approximately 8 weeks pregnant with vaginal bleeding. Patient has an elevated hCG quant in the 17,000 range with no localizable IUP by ultrasound. After discussion with OB/GYN it was recommended that this patient get methotrexate in the ED and follow-up with her primary care OB/GYN in 3 days. I discussed each of these tests and considerations with the patient and family. The patient and family agrees with the plan of discharge. Additional Considerations:  None    Critical Care Time:   I have spent 35 minutes of critical care time involved in lab review, consultations with specialist, family decision-making, and documentation. This time does not include time spent on separately billable procedures. During this entire length of time, I was immediately available to the patient. Critical Care:   The reason for providing this level of medical care for this critically ill patient was due a critical illness that impaired one or more vital organ systems such that there was a high probability of imminent or life-threatening deterioration in the patient's condition. This care involved high complexity decision making to assess, manipulate, and support vital system functions, to treat this degreee vital organ system failure and to prevent further life-threatening deterioration of the patient's condition. Catina Bernal MD    Diagnosis and Disposition     8:26 AM  DISCHARGE NOTE:  Srinath Mendiola  results have been reviewed with her. She has been counseled regarding her diagnosis, treatment, and plan. She verbally conveys understanding and agreement of the signs, symptoms, diagnosis, treatment and prognosis and additionally agrees to follow up as discussed. She also agrees with the care-plan and conveys that all of her questions have been answered. I have also provided discharge instructions for her that include: educational information regarding their diagnosis and treatment, and list of reasons why they would want to return to the ED prior to their follow-up appointment, should her condition change. She has been provided with education for proper emergency department utilization. CLINICAL IMPRESSION:    1. Acute UTI    2. Vaginal bleeding    3. Pregnancy of unknown anatomic location    4. Less than 8 weeks gestation of pregnancy        PLAN:  1. D/C Home  2. Current Discharge Medication List        START taking these medications    Details   cephALEXin (Keflex) 500 mg capsule Take 1 Capsule by mouth four (4) times daily for 7 days. Qty: 28 Capsule, Refills: 0  Start date: 1/5/2023, End date: 1/12/2023           3.    Follow-up Information       Follow up With Specialties Details Why Joselyn Montana MD Obstetrics & Gynecology, Gynecology, Obstetrics Schedule an appointment as soon as possible for a visit in 1 day  [address] [place] [address]      [facility] EMERGENCY DEPT Emergency Medicine  If symptoms worsen return immediately [address] [facility]  [phone]    Rajiv Tidwell MD Obstetrics & Gynecology, Gynecology, Obstetrics In 3 days For follow up from Emergency Department visit. [address]     Please note that this dictation was completed with [employer], the computer voice recognition software. Quite often unanticipated grammatical, syntax, homophones, and other interpretive errors are inadvertently transcribed by the computer software. Please disregard these errors. Please excuse any errors that have escaped final proofreading.     Stan Germain MD

## 2023-01-05 NOTE — DISCHARGE INSTRUCTIONS
Follow-up with OB/GYN. Call to make an appointment for 3 days from now. Return to the ED for worsening symptoms or for other concerns.

## 2023-01-05 NOTE — ED NOTES
Methotrexate treatment of ectopic pregnancy    Discharge instructions    You and your doctor have chosen methotrexate for treatment of an ectopic pregnancy (pregnancy in the fallopian tube). After receiving this medication, it is important to remember the followin. Sometimes an ectopic pregnancy causes the fallopian tube to rupture, or burst. Although your treatment is designed to prevent this from happening, tubal rupture could still occur. Rupture of the fallopian tube is a medical emergency. You must get help right away if you have any of the following signs of tubal rupture:    - increasing abdominal pain or cramping   - bleeding  - dizziness  - fainting  - weakness    2. If you had abdominal pain, cramping, or bleeding before your methotrexate treatment, these symptoms may persist for 1-2 days, then should begin to get better. Please get help right away if your abdominal discomfort or bleeding is not going away, or is getting worse instead of better. 3. Do not have sexual intercourse until your doctor says it is safe to do so. 4. Do not travel until your doctor says it is safe. 5. Do not participate in aerobic activity (such as running or swimming) until your doctor says it is safe. 6. Do not drink ANY alcoholic drinks while being treated with methotrexate, and for two weeks following treatment. 7. Do not take any vitamins or supplements containing folic acid or folate while being treated with methotrexate, and for one month following treatment. (Most prenatal vitamins do contain folate or folic acid. Do not take these vitamins.)    8. Do not take aspirin or other anti-inflammatory pain relievers, such as ibuprofen (Advil, Motrin) or naproxen (Aleve) while being treated with methotrexate and for one week following treatment. 9. Avoid direct exposure to the sun for one month after receiving methotrexate. Do not use tanning booths for one month.     10. You may experience side effects of methotrexate treatment. Please report any of the following to your doctor:    - mouth sores  - skin rash  - fatigue   - unusual bruising or bleeding  - upset stomach  - stinging sensation in the eyes    11. To decrease the chance of mouth sores, keep your mouth and gums clean. 12. You will be told when to come back to the hospital for more blood tests and examinations. It is very important to keep these appointments so that we can make sure the methotrexate is working. In some cases, another dose of methotrexate is needed. In other cases, surgery is required to treat the ectopic pregnancy. 15. Once your doctor has said you may resume sex, please use birth control so that you do not become pregnant again within two months of receiving the methotrexate. Please ask any questions you have about these instructions or about your methotrexate treatment.

## 2023-01-05 NOTE — ED PROVIDER NOTES
EMERGENCY DEPARTMENT HISTORY AND PHYSICAL EXAM    Date: 1/5/2023  Patient Name: Nathalie Alcocer    History of Presenting Illness     Chief Complaint   Patient presents with    Abdominal Pain         History Provided By: Patient        Additional History (Context): Nathalie Alcocer is a 30 y.o. female with obesity and thyroid disease  who presents with vaginal bleeding and pelvic discomfort. Patient says that she is a G7, P2 AB 4 whose LNMP was November 16. She started having pelvic pain and vaginal bleeding today. Not been seen by OB. Denies tobacco alcohol or illicits. Has not been worked up by high risk OB for her multiple miscarriages. PCP: Ester Rosado MD    Current Outpatient Medications   Medication Sig Dispense Refill    cephALEXin (Keflex) 500 mg capsule Take 1 Capsule by mouth four (4) times daily for 7 days. 28 Capsule 0    ibuprofen (MOTRIN) 800 mg tablet Take 1 Tab by mouth every eight (8) hours as needed for Pain. 30 Tab 0    prenatal vit calc,iron,folic (PRENATAL VITAMIN PO) Take  by mouth. cholecalciferol, vitamin D3, (VITAMIN D3) 2,000 unit tab Take  by mouth.      levothyroxine (SYNTHROID) 175 mcg tablet Take 175 mcg by mouth Daily (before breakfast). Past History     Past Medical History:  Past Medical History:   Diagnosis Date    Anemia     Anemia 5/5/2019    Asthma     Miscarriage 11-4-2015    Morbid obesity ([address] 11-4-2015    BMI 50.9 (May be off, pt is not sure how tall she is.)    Thyroid disease     hypo- no meds- No insurance till a few days ago (11-4-2015)       Past Surgical History:  Past Surgical History:   Procedure Laterality Date    HX DILATION AND CURETTAGE      HX OTHER SURGICAL         Family History:  No family history on file. Social History:  Social History     Tobacco Use    Smoking status: Former     Types: Cigarettes     Quit date: 11/4/2007     Years since quitting: 15.1    Smokeless tobacco: Never   Substance Use Topics    Alcohol use:  No Drug use: No       Allergies:  No Known Allergies      Review of Systems   Review of Systems   Constitutional: Negative. HENT: Negative. Eyes: Negative. Respiratory: Negative. Cardiovascular: Negative. Gastrointestinal: Negative. Endocrine: Negative. Genitourinary:  Positive for pelvic pain and vaginal bleeding. Negative for dysuria. Musculoskeletal: Negative. Skin: Negative. Allergic/Immunologic: Negative. Neurological: Negative. Hematological: Negative. Psychiatric/Behavioral: Negative. All Other Systems Negative  Physical Exam     Vitals:    01/04/23 2105 01/04/23 2106   BP:  (!) 176/88   Pulse:  97   Resp:  18   Temp:  97.3 °F (36.3 °C)   SpO2:  100%   Weight: 110.2 kg (243 lb)    Height: 5' 5\" (1.651 m)      Physical Exam  Vitals and nursing note reviewed. Constitutional:       Appearance: She is well-developed. She is obese. HENT:      Head: Normocephalic and atraumatic. Right Ear: External ear normal.      Left Ear: External ear normal.      Nose: Nose normal.   Eyes:      Conjunctiva/sclera: Conjunctivae normal.      Pupils: Pupils are equal, round, and reactive to light. Neck:      Vascular: No JVD. Trachea: No tracheal deviation. Cardiovascular:      Rate and Rhythm: Normal rate and regular rhythm. Heart sounds: Normal heart sounds. No murmur heard. No friction rub. No gallop. Pulmonary:      Effort: Pulmonary effort is normal. No respiratory distress. Breath sounds: Normal breath sounds. No wheezing or rales. Abdominal:      General: Bowel sounds are normal. There is no distension. Palpations: Abdomen is soft. There is no mass. Tenderness: There is abdominal tenderness in the suprapubic area. There is no guarding or rebound. Musculoskeletal:         General: No tenderness. Normal range of motion. Cervical back: Normal range of motion and neck supple. Skin:     General: Skin is warm and dry.       Findings: No rash.   Neurological:      Mental Status: She is alert and oriented to person, place, and time. Cranial Nerves: No cranial nerve deficit. Deep Tendon Reflexes: Reflexes are normal and symmetric. Psychiatric:         Behavior: Behavior normal.          Diagnostic Study Results     Labs -     Recent Results (from the past 12 hour(s))   URINALYSIS W/ RFLX MICROSCOPIC    Collection Time: 01/05/23  2:07 AM   Result Value Ref Range    Color RED      Appearance CLOUDY      Specific gravity 1.020 1.005 - 1.030      pH (UA) 5.5 5.0 - 8.0      Protein 100 (A) NEG mg/dL    Glucose Negative NEG mg/dL    Ketone TRACE (A) NEG mg/dL    Bilirubin Negative NEG      Blood LARGE (A) NEG      Urobilinogen 0.2 0.2 - 1.0 EU/dL    Nitrites Negative NEG      Leukocyte Esterase SMALL (A) NEG     URINE MICROSCOPIC ONLY    Collection Time: 01/05/23  2:07 AM   Result Value Ref Range    WBC 4 to 10 0 - 5 /hpf    RBC TOO NUMEROUS TO COUNT 0 - 5 /hpf    Epithelial cells 1+ 0 - 5 /lpf    Bacteria Negative NEG /hpf   CBC WITH AUTOMATED DIFF    Collection Time: 01/05/23  2:23 AM   Result Value Ref Range    WBC 9.6 4.6 - 13.2 K/uL    RBC 4.85 4.20 - 5.30 M/uL    HGB 11.1 (L) 12.0 - 16.0 g/dL    HCT 35.5 35.0 - 45.0 %    MCV 73.2 (L) 78.0 - 100.0 FL    MCH 22.9 (L) 24.0 - 34.0 PG    MCHC 31.3 31.0 - 37.0 g/dL    RDW 14.9 (H) 11.6 - 14.5 %    PLATELET 858 651 - 439 K/uL    MPV 9.4 9.2 - 11.8 FL    NRBC 0.0 0  WBC    ABSOLUTE NRBC 0.00 0.00 - 0.01 K/uL    NEUTROPHILS 72 40 - 73 %    LYMPHOCYTES 20 (L) 21 - 52 %    MONOCYTES 4 3 - 10 %    EOSINOPHILS 3 0 - 5 %    BASOPHILS 0 0 - 2 %    IMMATURE GRANULOCYTES 0 0.0 - 0.5 %    ABS. NEUTROPHILS 6.9 1.8 - 8.0 K/UL    ABS. LYMPHOCYTES 1.9 0.9 - 3.6 K/UL    ABS. MONOCYTES 0.4 0.05 - 1.2 K/UL    ABS. EOSINOPHILS 0.3 0.0 - 0.4 K/UL    ABS. BASOPHILS 0.0 0.0 - 0.1 K/UL    ABS. IMM.  GRANS. 0.0 0.00 - 0.04 K/UL    DF AUTOMATED         Radiologic Studies -   No orders to display     CT Results (Last 48 hours)      None          CXR Results  (Last 48 hours)      None              Medical Decision Making   I am the first provider for this patient. I reviewed the vital signs, available nursing notes, past medical history, past surgical history, family history and social history. Vital Signs-Reviewed the patient's vital signs. Records Reviewed: Nursing Notes, Old Medical Records, and Previous Laboratory Studies    Procedures:  Pelvic Exam    Date/Time: 1/5/2023 2:52 AM  Performed by: PA  Procedure duration:  5 minutes. Type of exam performed: bimanual and speculum. External genitalia appearance: normal.    Vaginal exam:  bleeding. Bleeding: mild  Cervical exam:  os closed, active bleeding from os and no cervical motion tenderness. Specimen(s) collected:  chlamydia, GC and vaginal culture. Bimanual exam:  uterine tenderness. Patient tolerance: patient tolerated the procedure well with no immediate complications      Provider Notes (Medical Decision Making): Delores Plane includes ectopic threatened miscarriage blighted ovum menses    Labs ultrasound ordered. Pelvic exam performed and patient has had 4 miscarriages spontaneously previously. Now she is having bleeding with an LMP of November 16. Appears comfortable during exam.      2:55 AM : Pt care transferred to Dr. Jeni Alan  ,ED provider. History of patient complaint(s), available diagnostic reports and current treatment plan has been discussed thoroughly. Bedside rounding on patient occured : yes . Intended disposition of patient : TBD  Pending diagnostics reports and/or labs (please list): labs, US      MED RECONCILIATION:  No current facility-administered medications for this encounter. Current Outpatient Medications   Medication Sig    cephALEXin (Keflex) 500 mg capsule Take 1 Capsule by mouth four (4) times daily for 7 days.     ibuprofen (MOTRIN) 800 mg tablet Take 1 Tab by mouth every eight (8) hours as needed for Pain.    prenatal vit calc,iron,folic (PRENATAL VITAMIN PO) Take  by mouth. cholecalciferol, vitamin D3, (VITAMIN D3) 2,000 unit tab Take  by mouth.    levothyroxine (SYNTHROID) 175 mcg tablet Take 175 mcg by mouth Daily (before breakfast). Disposition:  TBD    Follow-up Information       Follow up With Specialties Details Why Anat Frazier MD Obstetrics & Gynecology, Gynecology, Obstetrics Schedule an appointment as soon as possible for a visit in 1 day  [address] [place] [address]      [facility] EMERGENCY DEPT Emergency Medicine  If symptoms worsen return immediately [address] [place] [id number]  [phone]            Current Discharge Medication List        START taking these medications    Details   cephALEXin (Keflex) 500 mg capsule Take 1 Capsule by mouth four (4) times daily for 7 days. Qty: 28 Capsule, Refills: 0  Start date: 1/5/2023, End date: 1/12/2023                 Diagnosis     Clinical Impression:   1. Acute UTI    2.  Vaginal bleeding

## 2023-01-05 NOTE — ED NOTES
Talked to Pharmacist Kaleb Cervantes about the BHCg that is more than 5,000. She said, once the doctor and the patient agreed to get the medication it is ok for it is a relative contraindication and also the doppler showed no gestational sac.

## 2023-01-05 NOTE — PROGRESS NOTES
Home Precautions After Methotrexate    For 48 hours after receiving Methotrexate, patients and caregivers should follow these precautions:    Flush toilets twice each time they are used. IF possible you should use a separate toilet from others in the home. Always wash hands with soap and water after using the toilet. Caregivers must wear gloves when handling your blood, urine, stool, or emesis. Dispose of the gloves after each use and ensure that they wash their hands with soap and water. After using any devices for bodily waste, you should wash your hands and the devices with soap and water. Dry the devices with paper towels and discard the towels. Any sheets or clothes soiled with bodily fluids should be machine-washed twice in hot water with regular laundry detergent. Do not hand wash. IF you cannot wash them right away, place them in a sealed plastic bag. Absorbable undergarments should be placed in sealed plastic bags for disposal.  If your caregivers accidentally come in contact with bodily fluids, they should wash the area of exposure several times with soapy water and inform their doctors on their next visit. A single exposure may not do much harm, but caregivers should take extra precautions to avoid repeated exposure. Be sure that someone is with you,  because more help may be needed at those times.

## 2023-01-05 NOTE — ED NOTES
Methorexate IM given with Charge Nurse Fawn. Appropriate precautions done. Patient tolerated well, asked to stay for another 30 minutes for observation.

## 2023-01-05 NOTE — ED NOTES
Talked to Pharmacist Tammie Manuel where are other site of Methotrexate IM injection aside from  The buttocks.

## 2023-01-05 NOTE — ED NOTES
I have reviewed discharge instructions with the patient. Methotrexate discharge instructions and precautions given to the patient. The patient verbalized understanding.

## 2023-01-05 NOTE — ED NOTES
Received patient, awake on bed, alert and oriented. No unusualities or complaints made. Awaiting for OB GYN consult. Family is at bedside.

## 2023-01-06 LAB
BACTERIA SPEC CULT: NORMAL
CC UR VC: NORMAL
SERVICE CMNT-IMP: NORMAL

## (undated) DEVICE — SUTURE VCRL SZ 3-0 L27IN ABSRB UD KS L60MM STR REV CUT NDL J663H

## (undated) DEVICE — SUT VCRL + 0 36IN CT1 UD --

## (undated) DEVICE — SUTURE MCRYL SZ 1 L36IN ABSRB UD L36MM CT-1 1/2 CIR Y947H

## (undated) DEVICE — SUTURE VCRL + SZ 3-0 L36IN ABSRB UD L36MM CT-1 1/2 CIR VCP944H

## (undated) DEVICE — REM POLYHESIVE ADULT PATIENT RETURN ELECTRODE: Brand: VALLEYLAB

## (undated) DEVICE — STERILE POLYISOPRENE POWDER-FREE SURGICAL GLOVES: Brand: PROTEXIS

## (undated) DEVICE — 3L THIN WALL CAN: Brand: CRD

## (undated) DEVICE — SUTURE VCRL SZ 0 L36IN ABSRB VLT L40MM CT 1/2 CIR J358H

## (undated) DEVICE — KENDALL SCD EXPRESS SLEEVES, KNEE LENGTH, MEDIUM: Brand: KENDALL SCD

## (undated) DEVICE — HEX-LOCKING BLADE ELECTRODE: Brand: EDGE

## (undated) DEVICE — INTENDED FOR TISSUE SEPARATION, AND OTHER PROCEDURES THAT REQUIRE A SHARP SURGICAL BLADE TO PUNCTURE OR CUT.: Brand: BARD-PARKER ® CARBON RIB-BACK BLADES

## (undated) DEVICE — PACK PROCEDURE SURG BIRTH

## (undated) DEVICE — SOL IRRIGATION INJ NACL 0.9% 500ML BTL

## (undated) DEVICE — STRAP,POSITIONING,KNEE/BODY,FOAM,4X60": Brand: MEDLINE